# Patient Record
Sex: FEMALE | Race: OTHER | NOT HISPANIC OR LATINO | URBAN - METROPOLITAN AREA
[De-identification: names, ages, dates, MRNs, and addresses within clinical notes are randomized per-mention and may not be internally consistent; named-entity substitution may affect disease eponyms.]

---

## 2017-08-07 ENCOUNTER — OUTPATIENT (OUTPATIENT)
Dept: OUTPATIENT SERVICES | Facility: HOSPITAL | Age: 82
LOS: 1 days | Discharge: HOME | End: 2017-08-07

## 2017-08-07 DIAGNOSIS — C50.919 MALIGNANT NEOPLASM OF UNSPECIFIED SITE OF UNSPECIFIED FEMALE BREAST: ICD-10-CM

## 2017-08-09 ENCOUNTER — APPOINTMENT (OUTPATIENT)
Dept: HEMATOLOGY ONCOLOGY | Facility: CLINIC | Age: 82
End: 2017-08-09
Payer: MEDICARE

## 2017-08-09 VITALS
SYSTOLIC BLOOD PRESSURE: 135 MMHG | WEIGHT: 170.86 LBS | TEMPERATURE: 98.6 F | DIASTOLIC BLOOD PRESSURE: 87 MMHG | RESPIRATION RATE: 16 BRPM | BODY MASS INDEX: 30.27 KG/M2 | HEART RATE: 79 BPM | OXYGEN SATURATION: 96 %

## 2017-08-09 DIAGNOSIS — Z85.79 PERSONAL HISTORY OF OTHER MALIGNANT NEOPLASMS OF LYMPHOID, HEMATOPOIETIC AND RELATED TISSUES: ICD-10-CM

## 2017-08-09 PROCEDURE — 99215 OFFICE O/P EST HI 40 MIN: CPT

## 2018-07-18 ENCOUNTER — OUTPATIENT (OUTPATIENT)
Dept: OUTPATIENT SERVICES | Facility: HOSPITAL | Age: 83
LOS: 1 days | Discharge: HOME | End: 2018-07-18

## 2018-07-18 DIAGNOSIS — C50.919 MALIGNANT NEOPLASM OF UNSPECIFIED SITE OF UNSPECIFIED FEMALE BREAST: ICD-10-CM

## 2018-07-20 ENCOUNTER — OTHER (OUTPATIENT)
Age: 83
End: 2018-07-20

## 2018-07-24 ENCOUNTER — APPOINTMENT (OUTPATIENT)
Dept: HEMATOLOGY ONCOLOGY | Facility: CLINIC | Age: 83
End: 2018-07-24
Payer: MEDICARE

## 2018-07-24 VITALS
TEMPERATURE: 97.6 F | DIASTOLIC BLOOD PRESSURE: 90 MMHG | BODY MASS INDEX: 31.11 KG/M2 | SYSTOLIC BLOOD PRESSURE: 172 MMHG | RESPIRATION RATE: 16 BRPM | HEART RATE: 67 BPM | HEIGHT: 60.71 IN | OXYGEN SATURATION: 99 % | WEIGHT: 162.68 LBS

## 2018-07-24 PROCEDURE — 99214 OFFICE O/P EST MOD 30 MIN: CPT

## 2019-03-25 ENCOUNTER — TRANSCRIPTION ENCOUNTER (OUTPATIENT)
Age: 84
End: 2019-03-25

## 2019-07-31 ENCOUNTER — OUTPATIENT (OUTPATIENT)
Dept: OUTPATIENT SERVICES | Facility: HOSPITAL | Age: 84
LOS: 1 days | Discharge: HOME | End: 2019-07-31

## 2019-07-31 DIAGNOSIS — C50.919 MALIGNANT NEOPLASM OF UNSPECIFIED SITE OF UNSPECIFIED FEMALE BREAST: ICD-10-CM

## 2019-08-05 ENCOUNTER — APPOINTMENT (OUTPATIENT)
Dept: HEMATOLOGY ONCOLOGY | Facility: CLINIC | Age: 84
End: 2019-08-05
Payer: MEDICARE

## 2019-08-05 VITALS
SYSTOLIC BLOOD PRESSURE: 167 MMHG | BODY MASS INDEX: 27.76 KG/M2 | OXYGEN SATURATION: 93 % | RESPIRATION RATE: 16 BRPM | TEMPERATURE: 97.6 F | DIASTOLIC BLOOD PRESSURE: 102 MMHG | HEART RATE: 72 BPM | WEIGHT: 145.48 LBS

## 2019-08-05 PROCEDURE — 99214 OFFICE O/P EST MOD 30 MIN: CPT

## 2019-08-05 NOTE — ASSESSMENT
[FreeTextEntry1] : Pt to continue on surveillance for her treated breast ca and do mammograms, sonogram, bone density and Medical testing. Pt was encouraged to inc her calories and if weight loss continues she will need CT scans to assess for recurrent disease. Pt to RTO in 1 year or sooner if needed.Pt to get upper endoscopy and GI evaluation. [Curative] : Goals of care discussed with patient: Curative [Palliative Care Plan] : not applicable at this time

## 2019-08-05 NOTE — REVIEW OF SYSTEMS
[Negative] : Allergic/Immunologic [FreeTextEntry6] : 0cc cough pt has LPR [FreeTextEntry9] : has right thigh muscle stiffness and rigth thumb pain [FreeTextEntry7] : pt has LPR with reflux

## 2019-08-05 NOTE — HISTORY OF PRESENT ILLNESS
[de-identified] : The patient is an 84-year-old woman with history of breast carcinoma. Her history dates back to November 2002 when on mammogram the patient was found to have bilateral breast distortion and a biopsy of the left breast revealed infiltrating ductal carcinoma measuring 2 cm which was ER +90%, HI +90%, HER-2/sravani negative and 2 sentinel lymph nodes were negative. The right breast biopsy revealed tubular carcinoma, ER and HI positive. The patient underwent bilateral lumpectomies, bilateral radiation therapy and treatment with tamoxifen for 3 years and then aromasin which was switched to  anastrozole. She completed 5 years of aromatase inhibitor treatment. 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 [de-identified] : Since the last visit the pt remains well but has had a 17 lb weight loss and pt says she is not hungry on and off. Pt to do upper endoscopy for dysphagia.

## 2020-07-20 ENCOUNTER — RESULT REVIEW (OUTPATIENT)
Age: 85
End: 2020-07-20

## 2020-07-20 ENCOUNTER — APPOINTMENT (OUTPATIENT)
Dept: MAMMOGRAPHY | Facility: IMAGING CENTER | Age: 85
End: 2020-07-20
Payer: MEDICARE

## 2020-07-20 ENCOUNTER — APPOINTMENT (OUTPATIENT)
Dept: ULTRASOUND IMAGING | Facility: IMAGING CENTER | Age: 85
End: 2020-07-20
Payer: MEDICARE

## 2020-07-20 ENCOUNTER — OUTPATIENT (OUTPATIENT)
Dept: OUTPATIENT SERVICES | Facility: HOSPITAL | Age: 85
LOS: 1 days | End: 2020-07-20
Payer: MEDICARE

## 2020-07-20 DIAGNOSIS — C50.919 MALIGNANT NEOPLASM OF UNSPECIFIED SITE OF UNSPECIFIED FEMALE BREAST: ICD-10-CM

## 2020-07-20 PROCEDURE — 77067 SCR MAMMO BI INCL CAD: CPT | Mod: 26

## 2020-07-20 PROCEDURE — 77063 BREAST TOMOSYNTHESIS BI: CPT | Mod: 26

## 2020-07-20 PROCEDURE — 77067 SCR MAMMO BI INCL CAD: CPT

## 2020-07-20 PROCEDURE — 76641 ULTRASOUND BREAST COMPLETE: CPT

## 2020-07-20 PROCEDURE — 76641 ULTRASOUND BREAST COMPLETE: CPT | Mod: 26,50

## 2020-07-20 PROCEDURE — 77063 BREAST TOMOSYNTHESIS BI: CPT

## 2020-08-15 ENCOUNTER — OUTPATIENT (OUTPATIENT)
Dept: OUTPATIENT SERVICES | Facility: HOSPITAL | Age: 85
LOS: 1 days | Discharge: HOME | End: 2020-08-15

## 2020-08-15 DIAGNOSIS — C50.919 MALIGNANT NEOPLASM OF UNSPECIFIED SITE OF UNSPECIFIED FEMALE BREAST: ICD-10-CM

## 2020-08-19 ENCOUNTER — APPOINTMENT (OUTPATIENT)
Dept: HEMATOLOGY ONCOLOGY | Facility: CLINIC | Age: 85
End: 2020-08-19
Payer: MEDICARE

## 2020-08-19 PROCEDURE — 99442: CPT | Mod: 95

## 2020-08-19 NOTE — REASON FOR VISIT
[Home] : at home, [unfilled] , at the time of the visit. [Medical Office: ([facility])___] : at the medical office located in  [Verbal consent obtained from patient] : the patient, [unfilled] [Follow-Up Visit] : a follow-up [FreeTextEntry2] : breast ca

## 2020-08-19 NOTE — ASSESSMENT
[FreeTextEntry1] : The patient will continue monitoring for recurrence of her breast carcinoma which is been in remission with no evidence of recurrence for 18 years.  She will continue tapering her steroids for temporal arteritis.  She will continue to do mammograms sonogram and bone density.  She will continue medical evaluation and testing.  She will return to the office in 6 months or sooner as needed. [Curative] : Goals of care discussed with patient: Curative [Palliative Care Plan] : not applicable at this time

## 2020-08-19 NOTE — HISTORY OF PRESENT ILLNESS
[de-identified] : The patient is an 84-year-old woman with history of breast carcinoma. Her history dates back to November 2002 when on mammogram the patient was found to have bilateral breast distortion and a biopsy of the left breast revealed infiltrating ductal carcinoma measuring 2 cm which was ER +90%, FL +90%, HER-2/sravani negative and 2 sentinel lymph nodes were negative. The right breast biopsy revealed tubular carcinoma, ER and FL positive. The patient underwent bilateral lumpectomies, bilateral radiation therapy and treatment with tamoxifen for 3 years and then aromasin which was switched to  anastrozole. She completed 5 years of aromatase inhibitor treatment. 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 [de-identified] : Since the last visit the patient continues to do well and celebrated her 89th birthday.  She has no complaints and has had no recurrence of her breast carcinoma since 2002 18 years  ago.  The patient has also been on low-dose steroids prednisone for temporal arteritis.  She also has a history of mycosis fungoides and uses light therapy.

## 2021-07-20 ENCOUNTER — RESULT REVIEW (OUTPATIENT)
Age: 86
End: 2021-07-20

## 2021-07-21 ENCOUNTER — OUTPATIENT (OUTPATIENT)
Dept: OUTPATIENT SERVICES | Facility: HOSPITAL | Age: 86
LOS: 1 days | End: 2021-07-21
Payer: MEDICARE

## 2021-07-21 ENCOUNTER — RESULT REVIEW (OUTPATIENT)
Age: 86
End: 2021-07-21

## 2021-07-21 ENCOUNTER — APPOINTMENT (OUTPATIENT)
Dept: MAMMOGRAPHY | Facility: IMAGING CENTER | Age: 86
End: 2021-07-21
Payer: MEDICARE

## 2021-07-21 ENCOUNTER — APPOINTMENT (OUTPATIENT)
Dept: ULTRASOUND IMAGING | Facility: IMAGING CENTER | Age: 86
End: 2021-07-21
Payer: MEDICARE

## 2021-07-21 DIAGNOSIS — C50.919 MALIGNANT NEOPLASM OF UNSPECIFIED SITE OF UNSPECIFIED FEMALE BREAST: ICD-10-CM

## 2021-07-21 PROCEDURE — 77067 SCR MAMMO BI INCL CAD: CPT | Mod: 26

## 2021-07-21 PROCEDURE — 76641 ULTRASOUND BREAST COMPLETE: CPT | Mod: 26,50

## 2021-07-21 PROCEDURE — 77063 BREAST TOMOSYNTHESIS BI: CPT

## 2021-07-21 PROCEDURE — 77063 BREAST TOMOSYNTHESIS BI: CPT | Mod: 26

## 2021-07-21 PROCEDURE — 76641 ULTRASOUND BREAST COMPLETE: CPT

## 2021-07-21 PROCEDURE — 77067 SCR MAMMO BI INCL CAD: CPT

## 2021-08-03 ENCOUNTER — OUTPATIENT (OUTPATIENT)
Dept: OUTPATIENT SERVICES | Facility: HOSPITAL | Age: 86
LOS: 1 days | Discharge: HOME | End: 2021-08-03

## 2021-08-03 DIAGNOSIS — C50.919 MALIGNANT NEOPLASM OF UNSPECIFIED SITE OF UNSPECIFIED FEMALE BREAST: ICD-10-CM

## 2021-08-04 ENCOUNTER — APPOINTMENT (OUTPATIENT)
Dept: HEMATOLOGY ONCOLOGY | Facility: CLINIC | Age: 86
End: 2021-08-04
Payer: MEDICARE

## 2021-08-04 VITALS
HEIGHT: 61.02 IN | OXYGEN SATURATION: 97 % | TEMPERATURE: 98 F | RESPIRATION RATE: 16 BRPM | DIASTOLIC BLOOD PRESSURE: 95 MMHG | BODY MASS INDEX: 24.35 KG/M2 | WEIGHT: 128.97 LBS | HEART RATE: 69 BPM | SYSTOLIC BLOOD PRESSURE: 170 MMHG

## 2021-08-04 PROCEDURE — 99214 OFFICE O/P EST MOD 30 MIN: CPT

## 2021-08-04 NOTE — HISTORY OF PRESENT ILLNESS
[de-identified] : The patient is an 84-year-old woman with history of breast carcinoma. Her history dates back to November 2002 when on mammogram the patient was found to have bilateral breast distortion and a biopsy of the left breast revealed infiltrating ductal carcinoma measuring 2 cm which was ER +90%, MA +90%, HER-2/sravani negative and 2 sentinel lymph nodes were negative. The right breast biopsy revealed tubular carcinoma, ER and MA positive. The patient underwent bilateral lumpectomies, bilateral radiation therapy and treatment with tamoxifen for 3 years and then aromasin which was switched to  anastrozole. She completed 5 years of aromatase inhibitor treatment. 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 [de-identified] : Since the last visit the pt has had neck pain and BCE on tip of nose. pt uses cryoderm roll on. Pt on 1 mg of Prednisone. pt on prolia and synthroid, probiotic

## 2021-08-04 NOTE — REVIEW OF SYSTEMS
[Negative] : Allergic/Immunologic [FreeTextEntry7] : GERD on rx with acid reducer pantoprazole [de-identified] : No no skin lesions noted.

## 2021-08-04 NOTE — ASSESSMENT
[FreeTextEntry1] : The patient will continue surveillance for her breast carcinoma with recurrence in 2017.  She will continue on anastrozole and be followed for side effects and toxicity.  She will also do a bone density and cholesterol monitoring.  She will continue cardiac follow-up on Eliquis.  The patient will continue medical, GI and cardiology follow-up and testing.  She will return to the office in 1 year or sooner as needed.  She will undergo repeat mammogram and ultrasound. [Curative] : Goals of care discussed with patient: Curative [Palliative Care Plan] : not applicable at this time

## 2022-04-13 ENCOUNTER — RESULT REVIEW (OUTPATIENT)
Age: 87
End: 2022-04-13

## 2022-07-25 ENCOUNTER — APPOINTMENT (OUTPATIENT)
Dept: ULTRASOUND IMAGING | Facility: IMAGING CENTER | Age: 87
End: 2022-07-25

## 2022-07-25 ENCOUNTER — OUTPATIENT (OUTPATIENT)
Dept: OUTPATIENT SERVICES | Facility: HOSPITAL | Age: 87
LOS: 1 days | End: 2022-07-25
Payer: MEDICARE

## 2022-07-25 ENCOUNTER — RESULT REVIEW (OUTPATIENT)
Age: 87
End: 2022-07-25

## 2022-07-25 ENCOUNTER — APPOINTMENT (OUTPATIENT)
Dept: MAMMOGRAPHY | Facility: IMAGING CENTER | Age: 87
End: 2022-07-25

## 2022-07-25 DIAGNOSIS — C50.919 MALIGNANT NEOPLASM OF UNSPECIFIED SITE OF UNSPECIFIED FEMALE BREAST: ICD-10-CM

## 2022-07-25 PROCEDURE — 77067 SCR MAMMO BI INCL CAD: CPT

## 2022-07-25 PROCEDURE — 77066 DX MAMMO INCL CAD BI: CPT

## 2022-07-25 PROCEDURE — 77063 BREAST TOMOSYNTHESIS BI: CPT | Mod: 26

## 2022-07-25 PROCEDURE — 77067 SCR MAMMO BI INCL CAD: CPT | Mod: 26

## 2022-07-25 PROCEDURE — 77063 BREAST TOMOSYNTHESIS BI: CPT

## 2022-07-25 PROCEDURE — 76641 ULTRASOUND BREAST COMPLETE: CPT

## 2022-07-25 PROCEDURE — G0279: CPT

## 2022-07-25 PROCEDURE — 76641 ULTRASOUND BREAST COMPLETE: CPT | Mod: 26,50

## 2022-07-28 ENCOUNTER — OUTPATIENT (OUTPATIENT)
Dept: OUTPATIENT SERVICES | Facility: HOSPITAL | Age: 87
LOS: 1 days | End: 2022-07-28
Payer: MEDICARE

## 2022-07-28 ENCOUNTER — RESULT REVIEW (OUTPATIENT)
Age: 87
End: 2022-07-28

## 2022-07-28 ENCOUNTER — APPOINTMENT (OUTPATIENT)
Dept: MAMMOGRAPHY | Facility: IMAGING CENTER | Age: 87
End: 2022-07-28

## 2022-07-28 DIAGNOSIS — C50.919 MALIGNANT NEOPLASM OF UNSPECIFIED SITE OF UNSPECIFIED FEMALE BREAST: ICD-10-CM

## 2022-07-28 PROCEDURE — 88305 TISSUE EXAM BY PATHOLOGIST: CPT | Mod: 26

## 2022-07-28 PROCEDURE — 19081 BX BREAST 1ST LESION STRTCTC: CPT | Mod: LT

## 2022-07-28 PROCEDURE — 77065 DX MAMMO INCL CAD UNI: CPT

## 2022-07-28 PROCEDURE — A4648: CPT

## 2022-07-28 PROCEDURE — 88360 TUMOR IMMUNOHISTOCHEM/MANUAL: CPT | Mod: 26

## 2022-07-28 PROCEDURE — 88305 TISSUE EXAM BY PATHOLOGIST: CPT

## 2022-07-28 PROCEDURE — 19081 BX BREAST 1ST LESION STRTCTC: CPT

## 2022-07-28 PROCEDURE — 77065 DX MAMMO INCL CAD UNI: CPT | Mod: 26,LT

## 2022-07-28 PROCEDURE — 88360 TUMOR IMMUNOHISTOCHEM/MANUAL: CPT

## 2022-08-01 LAB — SURGICAL PATHOLOGY STUDY: SIGNIFICANT CHANGE UP

## 2022-08-09 ENCOUNTER — OUTPATIENT (OUTPATIENT)
Dept: OUTPATIENT SERVICES | Facility: HOSPITAL | Age: 87
LOS: 1 days | Discharge: HOME | End: 2022-08-09

## 2022-08-09 DIAGNOSIS — C50.919 MALIGNANT NEOPLASM OF UNSPECIFIED SITE OF UNSPECIFIED FEMALE BREAST: ICD-10-CM

## 2022-08-12 ENCOUNTER — APPOINTMENT (OUTPATIENT)
Dept: HEMATOLOGY ONCOLOGY | Facility: CLINIC | Age: 87
End: 2022-08-12

## 2022-08-12 VITALS
DIASTOLIC BLOOD PRESSURE: 78 MMHG | TEMPERATURE: 97.5 F | OXYGEN SATURATION: 97 % | WEIGHT: 122.8 LBS | HEART RATE: 68 BPM | RESPIRATION RATE: 16 BRPM | HEIGHT: 59.45 IN | BODY MASS INDEX: 24.43 KG/M2 | SYSTOLIC BLOOD PRESSURE: 177 MMHG

## 2022-08-12 PROCEDURE — 99214 OFFICE O/P EST MOD 30 MIN: CPT

## 2022-08-12 NOTE — REVIEW OF SYSTEMS
[Joint Pain] : joint pain [Joint Stiffness] : joint stiffness [Negative] : Respiratory [FreeTextEntry4] : chronic PND [FreeTextEntry7] : has GERD

## 2022-08-12 NOTE — HISTORY OF PRESENT ILLNESS
[Disease: _____________________] : Disease: [unfilled] [T: ___] : T[unfilled] [N: ___] : N[unfilled] [M: ___] : M[unfilled] [AJCC Stage: ____] : AJCC Stage: [unfilled] [de-identified] : The patient is an 84-year-old woman with history of breast carcinoma. Her history dates back to November 2002 when on mammogram the patient was found to have bilateral breast distortion and a biopsy of the left breast revealed infiltrating ductal carcinoma measuring 2 cm which was ER +90%, CT +90%, HER-2/sravani negative and 2 sentinel lymph nodes were negative. The right breast biopsy revealed tubular carcinoma, ER and CT positive. The patient underwent bilateral lumpectomies, bilateral radiation therapy and treatment with tamoxifen for 3 years and then aromasin which was switched to  anastrozole. She completed 5 years of aromatase inhibitor treatment. 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 [de-identified] : 2002- bilqteral breast ca , Left -IDC er and [place] ++, Roight- tubular caER and [place] pos  [de-identified] : Rx bilateral lumpectomies and RT and hormonal therapy bob and AI. [de-identified] : Pt has temporal arteritis, GERD, recurrent breast ca, has nocturia, pt has fatigue and sees Dr. Galindo for DJD

## 2022-08-12 NOTE — ASSESSMENT
[FreeTextEntry1] : A discussion took place with the patient regarding further management.  She has a history of bilateral breast carcinoma in 2002 infiltrating duct carcinoma stage I on the left and tubular carcinoma on the right stage I.  The patient underwent bilateral lumpectomies, radiation therapy and hormonal therapy.  She has done well until her recent mammogram showed calcifications in the left breast and biopsy now shows invasive moderately differentiated ductal carcinoma Vanessa score 6/9 measuring at least 6 mm along with ductal carcinoma in situ.  Lymphovascular invasion was not seen and the ER/FL HER2/sravani testing is pending.\par \par At this time the patient is reluctant to consider surgery because of her age.  It was recommended that she undergo surgical consultation which she is scheduling.  If she decides she does not want surgery then we will refer her for radiation therapy and hormonal therapy would also be recommended depending on the results of the hormone receptors.  The patient is also being monitored for her history of mycosis fungoides which remained stable.  Once we have more information further recommendations will be made. [Curative] : Goals of care discussed with patient: Curative [Palliative Care Plan] : not applicable at this time

## 2022-09-09 ENCOUNTER — APPOINTMENT (OUTPATIENT)
Dept: HEMATOLOGY ONCOLOGY | Facility: CLINIC | Age: 87
End: 2022-09-09

## 2022-09-09 VITALS
BODY MASS INDEX: 24.56 KG/M2 | RESPIRATION RATE: 16 BRPM | TEMPERATURE: 98.1 F | HEIGHT: 59.45 IN | HEART RATE: 77 BPM | SYSTOLIC BLOOD PRESSURE: 176 MMHG | OXYGEN SATURATION: 96 % | DIASTOLIC BLOOD PRESSURE: 81 MMHG | WEIGHT: 123.46 LBS

## 2022-09-09 PROCEDURE — 99213 OFFICE O/P EST LOW 20 MIN: CPT

## 2022-09-09 NOTE — ASSESSMENT
[FreeTextEntry1] : Pt to start letrrozole for left breast  ca IDC 6 mm ER and MT pos. Pt does not want surgery or RT at this time and will be followed for response to hormonal therapy. Side effects and toxicities were discussed in detail. Pt to repeat landon /sono in 3-4 months.  The patient will return to the office in 3 to 4 months for reevaluation. [Curative] : Goals of care discussed with patient: Curative [Palliative Care Plan] : not applicable at this time

## 2022-09-09 NOTE — REVIEW OF SYSTEMS
[Negative] : Allergic/Immunologic [FreeTextEntry7] : GERD [FreeTextEntry8] : has nocturia [FreeTextEntry9] : has knee hip and hand pain from DJD.

## 2022-09-09 NOTE — HISTORY OF PRESENT ILLNESS
[Disease: _____________________] : Disease: [unfilled] [T: ___] : T[unfilled] [N: ___] : N[unfilled] [M: ___] : M[unfilled] [AJCC Stage: ____] : AJCC Stage: [unfilled] [de-identified] : The patient is an 84-year-old woman with history of breast carcinoma. Her history dates back to November 2002 when on mammogram the patient was found to have bilateral breast distortion and a biopsy of the left breast revealed infiltrating ductal carcinoma measuring 2 cm which was ER +90%, GA +90%, HER-2/sravani negative and 2 sentinel lymph nodes were negative. The right breast biopsy revealed tubular carcinoma, ER and GA positive. The patient underwent bilateral lumpectomies, bilateral radiation therapy and treatment with tamoxifen for 3 years and then aromasin which was switched to  anastrozole. She completed 5 years of aromatase inhibitor treatment. 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 [de-identified] : 2002- bilqteral breast ca , Left -IDC er and [place] ++, Roight- tubular caER and [place] pos  [de-identified] : Rx bilateral lumpectomies and RT and hormonal therapy bob and AI. [de-identified] : Since the last visit the patient has undergone surgical consultation for newly diagnosed left breast carcinoma measuring 6 mm which is ER/TN positive and HER2 negative.  The at the present time the patient does not want to undergo surgery or radiation therapy.  We discussed going on letrozole since the tumor was strongly hormone positive and then monitoring her for side effects and toxicity with follow-up mammograms to assess for response to treatment.  The side effects were discussed in detail.  The patient will return in 3 months for follow-up evaluation

## 2022-11-17 ENCOUNTER — RESULT REVIEW (OUTPATIENT)
Age: 87
End: 2022-11-17

## 2022-12-14 ENCOUNTER — OUTPATIENT (OUTPATIENT)
Dept: OUTPATIENT SERVICES | Facility: HOSPITAL | Age: 87
LOS: 1 days | Discharge: HOME | End: 2022-12-14

## 2022-12-14 DIAGNOSIS — C50.919 MALIGNANT NEOPLASM OF UNSPECIFIED SITE OF UNSPECIFIED FEMALE BREAST: ICD-10-CM

## 2022-12-16 ENCOUNTER — APPOINTMENT (OUTPATIENT)
Dept: MRI IMAGING | Facility: IMAGING CENTER | Age: 87
End: 2022-12-16

## 2022-12-16 ENCOUNTER — OUTPATIENT (OUTPATIENT)
Dept: OUTPATIENT SERVICES | Facility: HOSPITAL | Age: 87
LOS: 1 days | End: 2022-12-16
Payer: MEDICARE

## 2022-12-16 DIAGNOSIS — C50.919 MALIGNANT NEOPLASM OF UNSPECIFIED SITE OF UNSPECIFIED FEMALE BREAST: ICD-10-CM

## 2022-12-16 PROCEDURE — C8937: CPT

## 2022-12-16 PROCEDURE — C8908: CPT

## 2022-12-16 PROCEDURE — C8905: CPT

## 2022-12-16 PROCEDURE — 77049 MRI BREAST C-+ W/CAD BI: CPT | Mod: 26

## 2022-12-20 ENCOUNTER — APPOINTMENT (OUTPATIENT)
Dept: HEMATOLOGY ONCOLOGY | Facility: CLINIC | Age: 87
End: 2022-12-20

## 2022-12-20 VITALS
OXYGEN SATURATION: 94 % | HEART RATE: 75 BPM | TEMPERATURE: 97.1 F | BODY MASS INDEX: 24.56 KG/M2 | SYSTOLIC BLOOD PRESSURE: 140 MMHG | WEIGHT: 123.46 LBS | RESPIRATION RATE: 16 BRPM | DIASTOLIC BLOOD PRESSURE: 80 MMHG

## 2022-12-20 PROCEDURE — 99214 OFFICE O/P EST MOD 30 MIN: CPT

## 2023-07-25 ENCOUNTER — OUTPATIENT (OUTPATIENT)
Dept: OUTPATIENT SERVICES | Facility: HOSPITAL | Age: 88
LOS: 1 days | Discharge: HOME | End: 2023-07-25

## 2023-07-25 DIAGNOSIS — C50.919 MALIGNANT NEOPLASM OF UNSPECIFIED SITE OF UNSPECIFIED FEMALE BREAST: ICD-10-CM

## 2023-07-27 ENCOUNTER — APPOINTMENT (OUTPATIENT)
Dept: ULTRASOUND IMAGING | Facility: IMAGING CENTER | Age: 88
End: 2023-07-27
Payer: MEDICARE

## 2023-07-27 ENCOUNTER — OUTPATIENT (OUTPATIENT)
Dept: OUTPATIENT SERVICES | Facility: HOSPITAL | Age: 88
LOS: 1 days | End: 2023-07-27
Payer: MEDICARE

## 2023-07-27 ENCOUNTER — RESULT REVIEW (OUTPATIENT)
Age: 88
End: 2023-07-27

## 2023-07-27 ENCOUNTER — APPOINTMENT (OUTPATIENT)
Dept: MAMMOGRAPHY | Facility: IMAGING CENTER | Age: 88
End: 2023-07-27
Payer: MEDICARE

## 2023-07-27 DIAGNOSIS — C50.919 MALIGNANT NEOPLASM OF UNSPECIFIED SITE OF UNSPECIFIED FEMALE BREAST: ICD-10-CM

## 2023-07-27 PROCEDURE — 76641 ULTRASOUND BREAST COMPLETE: CPT | Mod: 26,50,GA

## 2023-07-27 PROCEDURE — 77066 DX MAMMO INCL CAD BI: CPT

## 2023-07-27 PROCEDURE — G0279: CPT

## 2023-07-27 PROCEDURE — 77066 DX MAMMO INCL CAD BI: CPT | Mod: 26

## 2023-07-27 PROCEDURE — G0279: CPT | Mod: 26

## 2023-07-27 PROCEDURE — 76641 ULTRASOUND BREAST COMPLETE: CPT

## 2023-08-01 DIAGNOSIS — Z08 ENCOUNTER FOR FOLLOW-UP EXAMINATION AFTER COMPLETED TREATMENT FOR MALIGNANT NEOPLASM: ICD-10-CM

## 2023-08-01 DIAGNOSIS — Z85.3 ENCOUNTER FOR FOLLOW-UP EXAMINATION AFTER COMPLETED TREATMENT FOR MALIGNANT NEOPLASM: ICD-10-CM

## 2023-08-02 ENCOUNTER — APPOINTMENT (OUTPATIENT)
Dept: HEMATOLOGY ONCOLOGY | Facility: CLINIC | Age: 88
End: 2023-08-02
Payer: MEDICARE

## 2023-08-02 VITALS
SYSTOLIC BLOOD PRESSURE: 184 MMHG | DIASTOLIC BLOOD PRESSURE: 86 MMHG | OXYGEN SATURATION: 97 % | HEIGHT: 59.45 IN | WEIGHT: 120.15 LBS | TEMPERATURE: 97.2 F | HEART RATE: 70 BPM | RESPIRATION RATE: 17 BRPM | BODY MASS INDEX: 23.9 KG/M2

## 2023-08-02 DIAGNOSIS — C84.00 MYCOSIS FUNGOIDES, UNSPECIFIED SITE: ICD-10-CM

## 2023-08-02 DIAGNOSIS — C50.919 MALIGNANT NEOPLASM OF UNSPECIFIED SITE OF UNSPECIFIED FEMALE BREAST: ICD-10-CM

## 2023-08-02 DIAGNOSIS — M81.0 AGE-RELATED OSTEOPOROSIS W/OUT CURRENT PATHOLOGICAL FRACTURE: ICD-10-CM

## 2023-08-02 PROCEDURE — 99214 OFFICE O/P EST MOD 30 MIN: CPT

## 2023-08-02 RX ORDER — LETROZOLE TABLETS 2.5 MG/1
2.5 TABLET, FILM COATED ORAL
Qty: 90 | Refills: 3 | Status: ACTIVE | COMMUNITY
Start: 2022-09-09 | End: 1900-01-01

## 2023-08-03 PROBLEM — C84.00 MYCOSIS FUNGOIDES: Status: ACTIVE | Noted: 2017-08-09

## 2023-08-03 NOTE — HISTORY OF PRESENT ILLNESS
[Disease: _____________________] : Disease: [unfilled] [T: ___] : T[unfilled] [N: ___] : N[unfilled] [M: ___] : M[unfilled] [AJCC Stage: ____] : AJCC Stage: [unfilled] [de-identified] : The patient is an 84-year-old woman with history of breast carcinoma. Her history dates back to November 2002 when on mammogram the patient was found to have bilateral breast distortion and a biopsy of the left breast revealed infiltrating ductal carcinoma measuring 2 cm which was ER +90%, MT +90%, HER-2/sravani negative and 2 sentinel lymph nodes were negative. The right breast biopsy revealed tubular carcinoma, ER and MT positive. The patient underwent bilateral lumpectomies, bilateral radiation therapy and treatment with tamoxifen for 3 years and then aromasin which was switched to  anastrozole. She completed 5 years of aromatase inhibitor treatment. 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  Biopsy however revealed positive for infiltrating duct carcinoma ER/MT positive and the patient now is on letrozole for the last 3 months.  She tolerates the treatment well and returns to discuss further management.  The patient has undergone consultation with her surgeon Dr. Nichols and the patient chose not to do surgery at this time.  She underwent a MRI of the breast which again shows the area of calcification in the left breast measuring 2.5 cm.  There is no adenopathy or disease in the other breast. [de-identified] : 2002- bilateral breast ca , Left -IDC er and [place] ++, Roight- tubular caER and [place] pos  [de-identified] : Rx bilateral lumpectomies and RT and hormonal therapy bob and AI. [de-identified] : The patient returns for follow-up with history of left breast carcinoma on 2 occasions and now on anastrozole hormonal therapy.  She states she tolerates the medication well and has been on it for about 1 year.

## 2023-08-03 NOTE — REVIEW OF SYSTEMS
[Diarrhea: Grade 0] : Diarrhea: Grade 0 [Negative] : Allergic/Immunologic [FreeTextEntry4] : pt has post nasal drip [FreeTextEntry6] : pt has cough rom post nasal drip

## 2023-08-03 NOTE — PHYSICAL EXAM
[Ambulatory and capable of all self care but unable to carry out any work activities] : Status 2- Ambulatory and capable of all self care but unable to carry out any work activities. Up and about more than 50% of waking hours [Normal] : affect appropriate [de-identified] : pt has left breast lumpectomy incisions x 2

## 2023-08-03 NOTE — ASSESSMENT
[FreeTextEntry1] : The patient will continue surveillance for her second left breast primary Alex diagnosed in 2022.  The patient elected not to have surgery and currently is on hormonal therapy with anastrozole.  She tolerates the treatment well except for occasional minor hot flashes.  Her only symptoms cough from postnasal drip for which she uses nasal spray.  Patient also has mycosis fungoides but this condition is under control with no recent outbreaks or symptoms related to this condition.  The patient continues on surveillance with mammograms and she would be a candidate for bone density testing as she has a history of osteoporosis.  The patient will return to the office in 6 months or sooner as needed for continued treatment and surveillance. [Curative] : Goals of care discussed with patient: Curative

## 2023-08-17 NOTE — PHYSICAL EXAM
[Restricted in physically strenuous activity but ambulatory and able to carry out work of a light or sedentary nature] : Status 1- Restricted in physically strenuous activity but ambulatory and able to carry out work of a light or sedentary nature, e.g., light house work, office work [Normal] : affect appropriate [de-identified] : left lumpectomy, no mass

## 2023-08-17 NOTE — HISTORY OF PRESENT ILLNESS
[Disease: _____________________] : Disease: [unfilled] [T: ___] : T[unfilled] [N: ___] : N[unfilled] [M: ___] : M[unfilled] [AJCC Stage: ____] : AJCC Stage: [unfilled] [de-identified] : The patient is an 84-year-old woman with history of breast carcinoma. Her history dates back to November 2002 when on mammogram the patient was found to have bilateral breast distortion and a biopsy of the left breast revealed infiltrating ductal carcinoma measuring 2 cm which was ER +90%, IA +90%, HER-2/sravani negative and 2 sentinel lymph nodes were negative. The right breast biopsy revealed tubular carcinoma, ER and IA positive. The patient underwent bilateral lumpectomies, bilateral radiation therapy and treatment with tamoxifen for 3 years and then aromasin which was switched to  anastrozole. She completed 5 years of aromatase inhibitor treatment. 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  Biopsy however revealed positive for infiltrating duct carcinoma ER/IA positive and the patient now is on letrozole for the last 3 months.  She tolerates the treatment well and returns to discuss further management.  The patient has undergone consultation with her surgeon Dr. Nichols and the patient chose not to do surgery at this time.  She underwent a MRI of the breast which again shows the area of calcification in the left breast measuring 2.5 cm.  There is no adenopathy or disease in the other breast. [de-identified] : 2002- bilateral breast ca , Left -IDC er and [place] ++, Roight- tubular caER and [place] pos  [de-identified] : Rx bilateral lumpectomies and RT and hormonal therapy bob and AI. [de-identified] : Since the last visit the pt has been on letrozole and tolerates rx well.

## 2023-08-17 NOTE — ASSESSMENT
[Curative] : Goals of care discussed with patient: Curative [Palliative Care Plan] : not applicable at this time [FreeTextEntry1] : Overall the patient remained stable and has no signs or symptoms indicating progression of disease.  It is recommended she continue her letrozole which she tolerates well but she is concerned about continuing on medication.  She is now considering undergoing surgical resection and she will contact her surgeon Dr. Roberts.  Otherwise if she decides to hold surgery she will continue her hormonal therapy and return to the office in 3 months or sooner as needed.

## 2024-01-26 ENCOUNTER — APPOINTMENT (OUTPATIENT)
Dept: PODIATRY | Facility: CLINIC | Age: 89
End: 2024-01-26
Payer: MEDICARE

## 2024-01-26 DIAGNOSIS — M20.40 OTHER HAMMER TOE(S) (ACQUIRED), UNSPECIFIED FOOT: ICD-10-CM

## 2024-01-26 DIAGNOSIS — M20.10 HALLUX VALGUS (ACQUIRED), UNSPECIFIED FOOT: ICD-10-CM

## 2024-01-26 PROCEDURE — 11720 DEBRIDE NAIL 1-5: CPT | Mod: 59

## 2024-01-26 PROCEDURE — 99202 OFFICE O/P NEW SF 15 MIN: CPT | Mod: 25

## 2024-01-26 PROCEDURE — 11056 PARNG/CUTG B9 HYPRKR LES 2-4: CPT

## 2024-01-31 PROBLEM — M20.40 HAMMERTOE: Status: ACTIVE | Noted: 2024-01-29

## 2024-01-31 PROBLEM — M20.10 HALLUX VALGUS: Status: ACTIVE | Noted: 2024-01-29

## 2024-01-31 NOTE — HISTORY OF PRESENT ILLNESS
[FreeTextEntry1] : Patient is 89 years old and presents today with severe pain bilateral. She has a preulcerative lesion at the right tailor's bunion. She has a bunion and arthritic hammertoes with painful keratotic lesion that she is putting all sorts of pads on.  She cannot have surgery as she is 89. She feels like she had polio as a kid that was undiagnosed and her right foot is shorter and she has ankle problems.

## 2024-01-31 NOTE — ASSESSMENT
[FreeTextEntry1] : Impression: Keratosis. Onychomycosis. PVD. Pain. Hammertoe. Bunion.  Treatment: I trimmed keratotic lesions x4 without issue. I aggressively debrided and debulked painful mycotic nails x4 so she is more comfortable in shoe gear. I gave her aperture pads for the tailor's bunion, and I added 1/4" off-load into her shoe to destress this area and avoid potential problems. I discussed soaking with warm water and Epsom salt. I discussed better pads for her to be using over time. She will inspect the foot daily and follow-up in the office for high-risk foot care.

## 2024-01-31 NOTE — PHYSICAL EXAM
[Delayed in the Right Toes] : capillary refills delayed in the right toes [Delayed in the Left Toes] : capillary refills delayed in the left toes [0] : left foot posterior tibialis 0 [1+] : left foot dorsalis pedis 1+ [Vibration Dec.] : diminished vibratory sensation at the level of the toes [Diminished Throughout Right Foot] : diminished sensation with monofilament testing throughout right foot [Diminished Throughout Left Foot] : diminished sensation with monofilament testing throughout left foot [FreeTextEntry3] : Absent hair growth. Thin, atrophic skin. The patient has a class finding of Q8-Two Class B findings.  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 She has tailor's bunion with painful preulcerative keratotic lesion and deep seated keratosis that is starting to want to break down.  [FreeTextEntry1] : She has a preulcerative right sub 5 keratosis. She has a 1st interspace, 2nd toe DIPJ keratosis. Very painful distal 3rd toe and left 5th toe PIPJ keratosis. She has onychomycosis in most nails but the ones that are painful are 1, 3 and 4 on the left and 1 on the right. They are white, yellow, brown, thick, dystrophic, deformed and painful at the tops and tips.

## 2024-04-05 ENCOUNTER — APPOINTMENT (OUTPATIENT)
Dept: PODIATRY | Facility: CLINIC | Age: 89
End: 2024-04-05
Payer: MEDICARE

## 2024-04-05 DIAGNOSIS — M79.675 PAIN IN RIGHT TOE(S): ICD-10-CM

## 2024-04-05 DIAGNOSIS — M79.674 PAIN IN RIGHT TOE(S): ICD-10-CM

## 2024-04-05 DIAGNOSIS — B35.1 TINEA UNGUIUM: ICD-10-CM

## 2024-04-05 DIAGNOSIS — L85.1 ACQUIRED KERATOSIS [KERATODERMA] PALMARIS ET PLANTARIS: ICD-10-CM

## 2024-04-05 DIAGNOSIS — I70.91 GENERALIZED ATHEROSCLEROSIS: ICD-10-CM

## 2024-04-05 PROCEDURE — 11720 DEBRIDE NAIL 1-5: CPT | Mod: Q8,59

## 2024-04-05 PROCEDURE — 11056 PARNG/CUTG B9 HYPRKR LES 2-4: CPT | Mod: Q8

## 2024-04-08 PROBLEM — M79.674 PAIN IN TOES OF BOTH FEET: Status: ACTIVE | Noted: 2024-01-29

## 2024-04-08 PROBLEM — B35.1 ONYCHOMYCOSIS: Status: ACTIVE | Noted: 2024-01-29

## 2024-04-08 PROBLEM — L85.1 KERATODERMA, ACQUIRED: Status: ACTIVE | Noted: 2024-01-29

## 2024-04-08 PROBLEM — I70.91 GENERALIZED ATHEROSCLEROSIS: Status: ACTIVE | Noted: 2024-01-29

## 2024-04-09 NOTE — ASSESSMENT
[FreeTextEntry1] : Impression: Keratosis. Onychomycosis. PVD. Pain. Hammertoe.   Treatment: The foot was prepped with alcohol, and I trimmed keratotic lesions x4 without issue. I aggressively debrided and debulked painful mycotic nails x4 so she is more comfortable in shoe gear. I want her to get tube foam protectors and soak with warm water and Epsom salt. Continue extra width shoes.  She will inspect the foot daily and follow-up in the office for high-risk foot care.

## 2024-04-09 NOTE — PHYSICAL EXAM
[Delayed in the Right Toes] : capillary refills delayed in the right toes [Delayed in the Left Toes] : capillary refills delayed in the left toes [0] : left foot posterior tibialis 0 [1+] : left foot dorsalis pedis 1+ [Vibration Dec.] : diminished vibratory sensation at the level of the toes [Diminished Throughout Right Foot] : diminished sensation with monofilament testing throughout right foot [Diminished Throughout Left Foot] : diminished sensation with monofilament testing throughout left foot [FreeTextEntry3] : Absent hair growth. Thin, atrophic skin. The patient has a class finding of Q8-Two Class B findings.  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 She has tailor's bunion with painful preulcerative keratotic lesion and deep seated keratosis that is starting to want to break down.  [FreeTextEntry1] : She has a preulcerative right sub 5 keratosis. She has a 1st interspace, 2nd toe DIPJ keratosis. Very painful distal 3rd toe and left 5th toe PIPJ keratosis. She has onychomycosis in most nails but the ones that are painful are 1, 3 and 4 on the left and 1 on the right. They are white, yellow, brown, thick, dystrophic, deformed and painful at the tops and tips.

## 2024-04-09 NOTE — HISTORY OF PRESENT ILLNESS
[FreeTextEntry1] : Patient presents today for high-risk foot care due to poor circulation. She has worsening keratosis and onychomycosis that cause her a lot of pain.  The patient's history and physical has been reviewed and verified with no changes.

## 2024-07-10 ENCOUNTER — APPOINTMENT (OUTPATIENT)
Dept: PODIATRY | Facility: CLINIC | Age: 89
End: 2024-07-10
Payer: MEDICARE

## 2024-07-10 DIAGNOSIS — M20.40 OTHER HAMMER TOE(S) (ACQUIRED), UNSPECIFIED FOOT: ICD-10-CM

## 2024-07-10 DIAGNOSIS — M79.676 PAIN IN UNSPECIFIED TOE(S): ICD-10-CM

## 2024-07-10 PROCEDURE — 99212 OFFICE O/P EST SF 10 MIN: CPT

## 2024-07-15 PROBLEM — M79.676 TOE PAIN: Status: ACTIVE | Noted: 2024-07-12

## 2024-07-23 ENCOUNTER — APPOINTMENT (OUTPATIENT)
Dept: PODIATRY | Facility: CLINIC | Age: 89
End: 2024-07-23
Payer: MEDICARE

## 2024-07-23 DIAGNOSIS — M79.676 PAIN IN UNSPECIFIED TOE(S): ICD-10-CM

## 2024-07-23 DIAGNOSIS — M20.40 OTHER HAMMER TOE(S) (ACQUIRED), UNSPECIFIED FOOT: ICD-10-CM

## 2024-07-23 PROCEDURE — 99212 OFFICE O/P EST SF 10 MIN: CPT

## 2024-07-26 NOTE — PHYSICAL EXAM
[Delayed in the Right Toes] : capillary refills delayed in the right toes [Delayed in the Left Toes] : capillary refills delayed in the left toes [0] : left foot posterior tibialis 0 [1+] : left foot dorsalis pedis 1+ [Vibration Dec.] : diminished vibratory sensation at the level of the toes [Diminished Throughout Right Foot] : diminished sensation with monofilament testing throughout right foot [Diminished Throughout Left Foot] : diminished sensation with monofilament testing throughout left foot [FreeTextEntry3] : Absent hair growth. Thin, atrophic skin. The patient has a class finding of Q8-Two Class B findings.  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 She has tailor's bunion.  She has hammertoes and metatarsalgia and pain from arthritis.  [FreeTextEntry1] : The 2nd toe is preulcerative at the 1st interspace. The 3rd toe is painful at the tip.

## 2024-07-26 NOTE — ASSESSMENT
[FreeTextEntry1] : Impression: Horacio. Pain.  Treatment: I debrided keratotic lesions x2 without incidence, after prepping with alcohol. I gave her stretching exercises. I put aperture pads on and made a toe crest pad. I gave he the [employer] catalog.  We are not doing surgery. She could soak. She will follow my instructions and change shoe gear. Follow-up in the office with continued pain that persists.

## 2024-07-26 NOTE — PHYSICAL EXAM
[Delayed in the Right Toes] : capillary refills delayed in the right toes [Delayed in the Left Toes] : capillary refills delayed in the left toes [0] : left foot posterior tibialis 0 [1+] : left foot dorsalis pedis 1+ [Vibration Dec.] : diminished vibratory sensation at the level of the toes [Diminished Throughout Right Foot] : diminished sensation with monofilament testing throughout right foot [Diminished Throughout Left Foot] : diminished sensation with monofilament testing throughout left foot [FreeTextEntry3] : Absent hair growth. Thin, atrophic skin. The patient has a class finding of Q8-Two Class B findings.  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 She has tailor's bunion.  She has hammertoes and metatarsalgia and pain from arthritis.  [FreeTextEntry1] : The 2nd toe is preulcerative at the 1st interspace. The 3rd toe is painful at the tip.

## 2024-07-26 NOTE — PHYSICAL EXAM
[Delayed in the Right Toes] : capillary refills delayed in the right toes [Delayed in the Left Toes] : capillary refills delayed in the left toes [0] : left foot posterior tibialis 0 [1+] : left foot dorsalis pedis 1+ [Vibration Dec.] : diminished vibratory sensation at the level of the toes [Diminished Throughout Right Foot] : diminished sensation with monofilament testing throughout right foot [Diminished Throughout Left Foot] : diminished sensation with monofilament testing throughout left foot [FreeTextEntry3] : Absent hair growth. Thin, atrophic skin. The patient has a class finding of Q8-Two Class B findings.  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 She has tailor's bunion.  She has hammertoes and metatarsalgia and pain from arthritis.  [FreeTextEntry1] : The 2nd toe is preulcerative at the 1st interspace. The 3rd toe is painful at the tip.

## 2024-07-26 NOTE — HISTORY OF PRESENT ILLNESS
[FreeTextEntry1] : Patient presents today because of arthritic hammertoes and arthritic bunion, left.

## 2024-07-29 ENCOUNTER — OUTPATIENT (OUTPATIENT)
Dept: OUTPATIENT SERVICES | Facility: HOSPITAL | Age: 89
LOS: 1 days | End: 2024-07-29
Payer: MEDICARE

## 2024-07-29 ENCOUNTER — RESULT REVIEW (OUTPATIENT)
Age: 89
End: 2024-07-29

## 2024-07-29 ENCOUNTER — APPOINTMENT (OUTPATIENT)
Dept: MAMMOGRAPHY | Facility: IMAGING CENTER | Age: 89
End: 2024-07-29
Payer: MEDICARE

## 2024-07-29 ENCOUNTER — APPOINTMENT (OUTPATIENT)
Dept: ULTRASOUND IMAGING | Facility: IMAGING CENTER | Age: 89
End: 2024-07-29
Payer: MEDICARE

## 2024-07-29 DIAGNOSIS — C50.919 MALIGNANT NEOPLASM OF UNSPECIFIED SITE OF UNSPECIFIED FEMALE BREAST: ICD-10-CM

## 2024-07-29 PROCEDURE — 76641 ULTRASOUND BREAST COMPLETE: CPT | Mod: 26,50,GY

## 2024-07-29 PROCEDURE — 77066 DX MAMMO INCL CAD BI: CPT

## 2024-07-29 PROCEDURE — 77067 SCR MAMMO BI INCL CAD: CPT

## 2024-07-29 PROCEDURE — G0279: CPT

## 2024-07-29 PROCEDURE — G0279: CPT | Mod: 26

## 2024-07-29 PROCEDURE — 77063 BREAST TOMOSYNTHESIS BI: CPT

## 2024-07-29 PROCEDURE — 77066 DX MAMMO INCL CAD BI: CPT | Mod: 26

## 2024-07-29 PROCEDURE — 76641 ULTRASOUND BREAST COMPLETE: CPT

## 2024-08-01 ENCOUNTER — OUTPATIENT (OUTPATIENT)
Dept: OUTPATIENT SERVICES | Facility: HOSPITAL | Age: 88
LOS: 1 days | Discharge: HOME | End: 2024-08-01

## 2024-08-01 DIAGNOSIS — C50.919 MALIGNANT NEOPLASM OF UNSPECIFIED SITE OF UNSPECIFIED FEMALE BREAST: ICD-10-CM

## 2024-08-04 ENCOUNTER — NON-APPOINTMENT (OUTPATIENT)
Age: 89
End: 2024-08-04

## 2024-08-05 ENCOUNTER — APPOINTMENT (OUTPATIENT)
Dept: HEMATOLOGY ONCOLOGY | Facility: CLINIC | Age: 89
End: 2024-08-05

## 2024-08-05 PROCEDURE — 99214 OFFICE O/P EST MOD 30 MIN: CPT

## 2024-08-05 NOTE — ASSESSMENT
[Palliative] : Goals of care discussed with patient: Palliative [Palliative Care Plan] : not applicable at this time [FreeTextEntry1] : The patient will continue surveillance for history of recurrent breast carcinoma initially diagnosed in 2002 and 9/1/2022 she had recurrence and chose not to have resection because of her age.  She continues on letrozole and her recent mammogram and sonogram showed no progression of disease.  She continues to have calcifications in the left breast.  She will continue treatment for her osteoporosis with Prolia.  She also has diffuse arthritis and undergoes rheumatology follow-up with her primary care physician.  She also has increased nocturia every 2 hours and will discuss treatment with her primary care physician.  The patient will return in 12 months or sooner as needed.

## 2024-08-05 NOTE — REVIEW OF SYSTEMS
[Diarrhea: Grade 0] : Diarrhea: Grade 0 [Negative] : Allergic/Immunologic [FreeTextEntry9] : The patient undergoes rheumatology follow-up with Dr. Galindo her primary care physician for diffuse arthritis.  Patient also on Prolia for osteoporosis.

## 2024-08-05 NOTE — HISTORY OF PRESENT ILLNESS
[Disease: _____________________] : Disease: [unfilled] [T: ___] : T[unfilled] [N: ___] : N[unfilled] [M: ___] : M[unfilled] [AJCC Stage: ____] : AJCC Stage: [unfilled] [de-identified] : The patient is an 84-year-old woman with history of breast carcinoma. Her history dates back to November 2002 when on mammogram the patient was found to have bilateral breast distortion and a biopsy of the left breast revealed infiltrating ductal carcinoma measuring 2 cm which was ER +90%, TN +90%, HER-2/sravani negative and 2 sentinel lymph nodes were negative. The right breast biopsy revealed tubular carcinoma, ER and TN positive. The patient underwent bilateral lumpectomies, bilateral radiation therapy and treatment with tamoxifen for 3 years and then aromasin which was switched to  anastrozole. She completed 5 years of aromatase inhibitor treatment. 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  Biopsy however revealed positive for infiltrating duct carcinoma ER/TN positive and the patient now is on letrozole for the last 3 months.  She tolerates the treatment well and returns to discuss further management.  The patient has undergone consultation with her surgeon Dr. Nichols and the patient chose not to do surgery at this time.  She underwent a MRI of the breast which again shows the area of calcification in the left breast measuring 2.5 cm.  There is no adenopathy or disease in the other breast. [de-identified] : 2002- bilateral breast ca , Left -IDC er and MI ++, Roight- tubular caER and MI pos  [de-identified] : Rx bilateral lumpectomies and RT and hormonal therapy bob and AI. [de-identified] : The patient continues to do well and has no new symptoms indicating progression of disease.  She returns for follow-up on letrozole for recurrent carcinoma of the breast.

## 2024-08-05 NOTE — BEGINNING OF VISIT
[1] : 2) Feeling down, depressed, or hopeless for several days (1) [PHQ-9 Negative] : PHQ-9 Negative [SBP9Trybg] : 1 [Pain Scale: ___] : On a scale of 1-10, today the patient's pain is a(n) [unfilled]. [Never] : Never [Reviewed, no changes] : Reviewed, no changes [Diarrhea Character] : Diarrhea: Grade 0

## 2024-08-05 NOTE — HISTORY OF PRESENT ILLNESS
[Disease: _____________________] : Disease: [unfilled] [T: ___] : T[unfilled] [N: ___] : N[unfilled] [M: ___] : M[unfilled] [AJCC Stage: ____] : AJCC Stage: [unfilled] [de-identified] : The patient is an 84-year-old woman with history of breast carcinoma. Her history dates back to November 2002 when on mammogram the patient was found to have bilateral breast distortion and a biopsy of the left breast revealed infiltrating ductal carcinoma measuring 2 cm which was ER +90%, ME +90%, HER-2/sravani negative and 2 sentinel lymph nodes were negative. The right breast biopsy revealed tubular carcinoma, ER and ME positive. The patient underwent bilateral lumpectomies, bilateral radiation therapy and treatment with tamoxifen for 3 years and then aromasin which was switched to  anastrozole. She completed 5 years of aromatase inhibitor treatment. 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  Biopsy however revealed positive for infiltrating duct carcinoma ER/ME positive and the patient now is on letrozole for the last 3 months.  She tolerates the treatment well and returns to discuss further management.  The patient has undergone consultation with her surgeon Dr. Nichols and the patient chose not to do surgery at this time.  She underwent a MRI of the breast which again shows the area of calcification in the left breast measuring 2.5 cm.  There is no adenopathy or disease in the other breast. [de-identified] : 2002- bilateral breast ca , Left -IDC er and WA ++, Roight- tubular caER and WA pos  [de-identified] : Rx bilateral lumpectomies and RT and hormonal therapy bob and AI. [de-identified] : The patient continues to do well and has no new symptoms indicating progression of disease.  She returns for follow-up on letrozole for recurrent carcinoma of the breast. additional location...

## 2024-08-05 NOTE — BEGINNING OF VISIT
[1] : 2) Feeling down, depressed, or hopeless for several days (1) [PHQ-9 Negative] : PHQ-9 Negative [QOF2Orsph] : 1 [Pain Scale: ___] : On a scale of 1-10, today the patient's pain is a(n) [unfilled]. [Never] : Never [Reviewed, no changes] : Reviewed, no changes [Diarrhea Character] : Diarrhea: Grade 0

## 2024-09-18 ENCOUNTER — APPOINTMENT (OUTPATIENT)
Dept: PODIATRY | Facility: CLINIC | Age: 89
End: 2024-09-18

## 2024-09-18 DIAGNOSIS — M20.40 OTHER HAMMER TOE(S) (ACQUIRED), UNSPECIFIED FOOT: ICD-10-CM

## 2024-09-18 PROCEDURE — 99212 OFFICE O/P EST SF 10 MIN: CPT

## 2024-09-24 NOTE — PHYSICAL EXAM
[Delayed in the Right Toes] : capillary refills delayed in the right toes [Delayed in the Left Toes] : capillary refills delayed in the left toes [0] : left foot posterior tibialis 0 [1+] : left foot dorsalis pedis 1+ [Vibration Dec.] : diminished vibratory sensation at the level of the toes [Diminished Throughout Right Foot] : diminished sensation with monofilament testing throughout right foot [Diminished Throughout Left Foot] : diminished sensation with monofilament testing throughout left foot [FreeTextEntry3] : Absent hair growth. Thin, atrophic skin. The patient has a class finding of Q8-Two Class B findings.  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 [FreeTextEntry1] : Preulcerative keratotic lesions right 2nd and 3rd toes as well as the tailor's bunion.

## 2024-09-24 NOTE — PHYSICAL EXAM
[Delayed in the Right Toes] : capillary refills delayed in the right toes [Delayed in the Left Toes] : capillary refills delayed in the left toes [0] : left foot posterior tibialis 0 [1+] : left foot dorsalis pedis 1+ [Vibration Dec.] : diminished vibratory sensation at the level of the toes [Diminished Throughout Right Foot] : diminished sensation with monofilament testing throughout right foot [Diminished Throughout Left Foot] : diminished sensation with monofilament testing throughout left foot [FreeTextEntry3] : Absent hair growth. Thin, atrophic skin. The patient has a class finding of Q8-Two Class B findings.  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 [FreeTextEntry1] : Preulcerative keratotic lesions right 2nd and 3rd toes as well as the tailor's bunion.

## 2024-09-24 NOTE — PHYSICAL EXAM
[Delayed in the Right Toes] : capillary refills delayed in the right toes [Delayed in the Left Toes] : capillary refills delayed in the left toes [0] : left foot posterior tibialis 0 [1+] : left foot dorsalis pedis 1+ [Vibration Dec.] : diminished vibratory sensation at the level of the toes [Diminished Throughout Right Foot] : diminished sensation with monofilament testing throughout right foot [Diminished Throughout Left Foot] : diminished sensation with monofilament testing throughout left foot [FreeTextEntry3] : Absent hair growth. Thin, atrophic skin. The patient has a class finding of Q8-Two Class B findings.  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 [FreeTextEntry1] : Preulcerative keratotic lesions right 2nd and 3rd toes as well as the tailor's bunion.

## 2024-09-24 NOTE — ASSESSMENT
[FreeTextEntry1] : Impression: Hammertoes, right foot.   Treatment: Keratosis was prepped with alcohol and trimmed without incident. I put aperture pads on and different pads. I recommended different separators and protectors made of leather or foam, or just cotton balls. Discussed memory foam shoes and soaking with warm water and Epsom salt. She is not a candidate for surgery. She will follow-up for evaluation.

## 2024-09-24 NOTE — HISTORY OF PRESENT ILLNESS
[FreeTextEntry1] : Patient presents today because of arthritic hammertoes 2 and 3 on the right with right tailor's bunion. The arthritis is getting worse. She has severe pain. She is constantly putting pads on.

## 2024-10-21 ENCOUNTER — NON-APPOINTMENT (OUTPATIENT)
Age: 89
End: 2024-10-21

## 2024-11-27 ENCOUNTER — APPOINTMENT (OUTPATIENT)
Dept: PODIATRY | Facility: CLINIC | Age: 89
End: 2024-11-27

## 2024-11-27 DIAGNOSIS — B35.1 TINEA UNGUIUM: ICD-10-CM

## 2024-11-27 DIAGNOSIS — L85.1 ACQUIRED KERATOSIS [KERATODERMA] PALMARIS ET PLANTARIS: ICD-10-CM

## 2024-11-27 DIAGNOSIS — M79.675 PAIN IN RIGHT TOE(S): ICD-10-CM

## 2024-11-27 DIAGNOSIS — M79.674 PAIN IN RIGHT TOE(S): ICD-10-CM

## 2024-11-27 DIAGNOSIS — I70.91 GENERALIZED ATHEROSCLEROSIS: ICD-10-CM

## 2024-11-27 PROCEDURE — 11720 DEBRIDE NAIL 1-5: CPT | Mod: Q8,59

## 2024-11-27 PROCEDURE — 11056 PARNG/CUTG B9 HYPRKR LES 2-4: CPT | Mod: Q8

## 2025-03-05 ENCOUNTER — APPOINTMENT (OUTPATIENT)
Dept: PODIATRY | Facility: CLINIC | Age: 89
End: 2025-03-05

## 2025-03-05 DIAGNOSIS — I70.91 GENERALIZED ATHEROSCLEROSIS: ICD-10-CM

## 2025-03-05 DIAGNOSIS — L85.1 ACQUIRED KERATOSIS [KERATODERMA] PALMARIS ET PLANTARIS: ICD-10-CM

## 2025-03-05 DIAGNOSIS — B35.1 TINEA UNGUIUM: ICD-10-CM

## 2025-03-05 DIAGNOSIS — M79.674 PAIN IN RIGHT TOE(S): ICD-10-CM

## 2025-03-05 DIAGNOSIS — M79.675 PAIN IN RIGHT TOE(S): ICD-10-CM

## 2025-03-05 PROCEDURE — 11720 DEBRIDE NAIL 1-5: CPT | Mod: Q8,59

## 2025-03-05 PROCEDURE — 11056 PARNG/CUTG B9 HYPRKR LES 2-4: CPT | Mod: Q8

## 2025-04-14 ENCOUNTER — INPATIENT (INPATIENT)
Facility: HOSPITAL | Age: 89
LOS: 2 days | Discharge: NURSING, REHAB OR LONG-TERM CARE | DRG: 206 | End: 2025-04-17
Attending: SURGERY | Admitting: SURGERY
Payer: MEDICARE

## 2025-04-14 VITALS
DIASTOLIC BLOOD PRESSURE: 90 MMHG | SYSTOLIC BLOOD PRESSURE: 154 MMHG | HEIGHT: 57 IN | TEMPERATURE: 98 F | RESPIRATION RATE: 20 BRPM | WEIGHT: 89.95 LBS | OXYGEN SATURATION: 98 % | HEART RATE: 76 BPM

## 2025-04-14 DIAGNOSIS — R09.02 HYPOXEMIA: ICD-10-CM

## 2025-04-14 LAB
ALBUMIN SERPL ELPH-MCNC: 3.7 G/DL — SIGNIFICANT CHANGE UP (ref 3.3–5)
ALP SERPL-CCNC: 135 U/L — HIGH (ref 40–120)
ALT FLD-CCNC: 21 U/L — SIGNIFICANT CHANGE UP (ref 10–45)
ANION GAP SERPL CALC-SCNC: 16 MMOL/L — SIGNIFICANT CHANGE UP (ref 5–17)
ANION GAP SERPL CALC-SCNC: 19 MMOL/L — HIGH (ref 5–17)
ANISOCYTOSIS BLD QL: SLIGHT — SIGNIFICANT CHANGE UP
APTT BLD: 24 SEC — LOW (ref 24.5–35.6)
APTT BLD: 26.8 SEC — SIGNIFICANT CHANGE UP (ref 24.5–35.6)
AST SERPL-CCNC: 38 U/L — SIGNIFICANT CHANGE UP (ref 10–40)
BASOPHILS # BLD AUTO: 0.02 K/UL — SIGNIFICANT CHANGE UP (ref 0–0.2)
BASOPHILS # BLD AUTO: 0.12 K/UL — SIGNIFICANT CHANGE UP (ref 0–0.2)
BASOPHILS NFR BLD AUTO: 0.2 % — SIGNIFICANT CHANGE UP (ref 0–2)
BASOPHILS NFR BLD AUTO: 0.9 % — SIGNIFICANT CHANGE UP (ref 0–2)
BILIRUB SERPL-MCNC: 0.3 MG/DL — SIGNIFICANT CHANGE UP (ref 0.2–1.2)
BLD GP AB SCN SERPL QL: NEGATIVE — SIGNIFICANT CHANGE UP
BUN SERPL-MCNC: 39 MG/DL — HIGH (ref 7–23)
BUN SERPL-MCNC: 42 MG/DL — HIGH (ref 7–23)
BURR CELLS BLD QL SMEAR: PRESENT — SIGNIFICANT CHANGE UP
CALCIUM SERPL-MCNC: 8.7 MG/DL — SIGNIFICANT CHANGE UP (ref 8.4–10.5)
CALCIUM SERPL-MCNC: 9.3 MG/DL — SIGNIFICANT CHANGE UP (ref 8.4–10.5)
CHLORIDE SERPL-SCNC: 86 MMOL/L — LOW (ref 96–108)
CHLORIDE SERPL-SCNC: 86 MMOL/L — LOW (ref 96–108)
CO2 SERPL-SCNC: 23 MMOL/L — SIGNIFICANT CHANGE UP (ref 22–31)
CO2 SERPL-SCNC: 25 MMOL/L — SIGNIFICANT CHANGE UP (ref 22–31)
CREAT SERPL-MCNC: 1.08 MG/DL — SIGNIFICANT CHANGE UP (ref 0.5–1.3)
CREAT SERPL-MCNC: 1.17 MG/DL — SIGNIFICANT CHANGE UP (ref 0.5–1.3)
EGFR: 43 ML/MIN/1.73M2 — LOW
EGFR: 43 ML/MIN/1.73M2 — LOW
EGFR: 48 ML/MIN/1.73M2 — LOW
EGFR: 48 ML/MIN/1.73M2 — LOW
ELLIPTOCYTES BLD QL SMEAR: SLIGHT — SIGNIFICANT CHANGE UP
EOSINOPHIL # BLD AUTO: 0 K/UL — SIGNIFICANT CHANGE UP (ref 0–0.5)
EOSINOPHIL # BLD AUTO: 0 K/UL — SIGNIFICANT CHANGE UP (ref 0–0.5)
EOSINOPHIL NFR BLD AUTO: 0 % — SIGNIFICANT CHANGE UP (ref 0–6)
EOSINOPHIL NFR BLD AUTO: 0 % — SIGNIFICANT CHANGE UP (ref 0–6)
FLUAV AG NPH QL: SIGNIFICANT CHANGE UP
FLUBV AG NPH QL: SIGNIFICANT CHANGE UP
GAS PNL BLDV: SIGNIFICANT CHANGE UP
GAS PNL BLDV: SIGNIFICANT CHANGE UP
GLUCOSE SERPL-MCNC: 92 MG/DL — SIGNIFICANT CHANGE UP (ref 70–99)
GLUCOSE SERPL-MCNC: 98 MG/DL — SIGNIFICANT CHANGE UP (ref 70–99)
HCT VFR BLD CALC: 23.3 % — LOW (ref 34.5–45)
HCT VFR BLD CALC: 25.4 % — LOW (ref 34.5–45)
HCT VFR BLD CALC: 26.8 % — LOW (ref 34.5–45)
HGB BLD-MCNC: 8.1 G/DL — LOW (ref 11.5–15.5)
HGB BLD-MCNC: 8.5 G/DL — LOW (ref 11.5–15.5)
HGB BLD-MCNC: 9.1 G/DL — LOW (ref 11.5–15.5)
IMM GRANULOCYTES NFR BLD AUTO: 0.5 % — SIGNIFICANT CHANGE UP (ref 0–0.9)
INR BLD: 1.17 RATIO — HIGH (ref 0.85–1.16)
INR BLD: 1.19 RATIO — HIGH (ref 0.85–1.16)
LYMPHOCYTES # BLD AUTO: 0.6 K/UL — LOW (ref 1–3.3)
LYMPHOCYTES # BLD AUTO: 0.82 K/UL — LOW (ref 1–3.3)
LYMPHOCYTES # BLD AUTO: 4.3 % — LOW (ref 13–44)
LYMPHOCYTES # BLD AUTO: 7.5 % — LOW (ref 13–44)
MAGNESIUM SERPL-MCNC: 1.8 MG/DL — SIGNIFICANT CHANGE UP (ref 1.6–2.6)
MANUAL SMEAR VERIFICATION: SIGNIFICANT CHANGE UP
MCHC RBC-ENTMCNC: 29.3 PG — SIGNIFICANT CHANGE UP (ref 27–34)
MCHC RBC-ENTMCNC: 29.4 PG — SIGNIFICANT CHANGE UP (ref 27–34)
MCHC RBC-ENTMCNC: 30.2 PG — SIGNIFICANT CHANGE UP (ref 27–34)
MCHC RBC-ENTMCNC: 33.5 G/DL — SIGNIFICANT CHANGE UP (ref 32–36)
MCHC RBC-ENTMCNC: 34 G/DL — SIGNIFICANT CHANGE UP (ref 32–36)
MCHC RBC-ENTMCNC: 34.8 G/DL — SIGNIFICANT CHANGE UP (ref 32–36)
MCV RBC AUTO: 86.5 FL — SIGNIFICANT CHANGE UP (ref 80–100)
MCV RBC AUTO: 86.9 FL — SIGNIFICANT CHANGE UP (ref 80–100)
MCV RBC AUTO: 87.6 FL — SIGNIFICANT CHANGE UP (ref 80–100)
MICROCYTES BLD QL: SLIGHT — SIGNIFICANT CHANGE UP
MONOCYTES # BLD AUTO: 1.08 K/UL — HIGH (ref 0–0.9)
MONOCYTES # BLD AUTO: 1.36 K/UL — HIGH (ref 0–0.9)
MONOCYTES NFR BLD AUTO: 12.4 % — SIGNIFICANT CHANGE UP (ref 2–14)
MONOCYTES NFR BLD AUTO: 7.8 % — SIGNIFICANT CHANGE UP (ref 2–14)
NEUTROPHILS # BLD AUTO: 12.05 K/UL — HIGH (ref 1.8–7.4)
NEUTROPHILS # BLD AUTO: 8.71 K/UL — HIGH (ref 1.8–7.4)
NEUTROPHILS NFR BLD AUTO: 79.4 % — HIGH (ref 43–77)
NEUTROPHILS NFR BLD AUTO: 86.1 % — HIGH (ref 43–77)
NEUTS BAND # BLD: 0.9 % — SIGNIFICANT CHANGE UP (ref 0–8)
NEUTS BAND NFR BLD: 0.9 % — SIGNIFICANT CHANGE UP (ref 0–8)
NRBC BLD AUTO-RTO: 0 /100 WBCS — SIGNIFICANT CHANGE UP (ref 0–0)
NRBC BLD AUTO-RTO: 0 /100 WBCS — SIGNIFICANT CHANGE UP (ref 0–0)
NT-PROBNP SERPL-SCNC: 1810 PG/ML — HIGH (ref 0–300)
OVALOCYTES BLD QL SMEAR: SLIGHT — SIGNIFICANT CHANGE UP
PHOSPHATE SERPL-MCNC: 3 MG/DL — SIGNIFICANT CHANGE UP (ref 2.5–4.5)
PLAT MORPH BLD: ABNORMAL
PLATELET # BLD AUTO: 289 K/UL — SIGNIFICANT CHANGE UP (ref 150–400)
PLATELET # BLD AUTO: 317 K/UL — SIGNIFICANT CHANGE UP (ref 150–400)
PLATELET # BLD AUTO: 321 K/UL — SIGNIFICANT CHANGE UP (ref 150–400)
POIKILOCYTOSIS BLD QL AUTO: SLIGHT — SIGNIFICANT CHANGE UP
POLYCHROMASIA BLD QL SMEAR: SLIGHT — SIGNIFICANT CHANGE UP
POTASSIUM SERPL-MCNC: 3.3 MMOL/L — LOW (ref 3.5–5.3)
POTASSIUM SERPL-MCNC: 3.8 MMOL/L — SIGNIFICANT CHANGE UP (ref 3.5–5.3)
POTASSIUM SERPL-SCNC: 3.3 MMOL/L — LOW (ref 3.5–5.3)
POTASSIUM SERPL-SCNC: 3.8 MMOL/L — SIGNIFICANT CHANGE UP (ref 3.5–5.3)
PROT SERPL-MCNC: 6.4 G/DL — SIGNIFICANT CHANGE UP (ref 6–8.3)
PROTHROM AB SERPL-ACNC: 13.3 SEC — SIGNIFICANT CHANGE UP (ref 9.9–13.4)
PROTHROM AB SERPL-ACNC: 13.5 SEC — HIGH (ref 9.9–13.4)
RBC # BLD: 2.68 M/UL — LOW (ref 3.8–5.2)
RBC # BLD: 2.9 M/UL — LOW (ref 3.8–5.2)
RBC # BLD: 3.1 M/UL — LOW (ref 3.8–5.2)
RBC # FLD: 14.7 % — HIGH (ref 10.3–14.5)
RBC # FLD: 14.7 % — HIGH (ref 10.3–14.5)
RBC # FLD: 14.9 % — HIGH (ref 10.3–14.5)
RBC BLD AUTO: ABNORMAL
RH IG SCN BLD-IMP: POSITIVE — SIGNIFICANT CHANGE UP
RH IG SCN BLD-IMP: POSITIVE — SIGNIFICANT CHANGE UP
RSV RNA NPH QL NAA+NON-PROBE: SIGNIFICANT CHANGE UP
SARS-COV-2 RNA SPEC QL NAA+PROBE: SIGNIFICANT CHANGE UP
SODIUM SERPL-SCNC: 127 MMOL/L — LOW (ref 135–145)
SODIUM SERPL-SCNC: 128 MMOL/L — LOW (ref 135–145)
SOURCE RESPIRATORY: SIGNIFICANT CHANGE UP
TROPONIN T, HIGH SENSITIVITY RESULT: 39 NG/L — SIGNIFICANT CHANGE UP (ref 0–51)
WBC # BLD: 10.1 K/UL — SIGNIFICANT CHANGE UP (ref 3.8–10.5)
WBC # BLD: 10.96 K/UL — HIGH (ref 3.8–10.5)
WBC # BLD: 13.85 K/UL — HIGH (ref 3.8–10.5)
WBC # FLD AUTO: 10.1 K/UL — SIGNIFICANT CHANGE UP (ref 3.8–10.5)
WBC # FLD AUTO: 10.96 K/UL — HIGH (ref 3.8–10.5)
WBC # FLD AUTO: 13.85 K/UL — HIGH (ref 3.8–10.5)

## 2025-04-14 PROCEDURE — 71046 X-RAY EXAM CHEST 2 VIEWS: CPT | Mod: 26

## 2025-04-14 PROCEDURE — 32555 ASPIRATE PLEURA W/ IMAGING: CPT | Mod: LT

## 2025-04-14 PROCEDURE — 71275 CT ANGIOGRAPHY CHEST: CPT | Mod: 26

## 2025-04-14 PROCEDURE — 71045 X-RAY EXAM CHEST 1 VIEW: CPT | Mod: 26,XE

## 2025-04-14 PROCEDURE — 74177 CT ABD & PELVIS W/CONTRAST: CPT | Mod: 26

## 2025-04-14 PROCEDURE — 99285 EMERGENCY DEPT VISIT HI MDM: CPT | Mod: 25,GC

## 2025-04-14 PROCEDURE — 93010 ELECTROCARDIOGRAM REPORT: CPT

## 2025-04-14 RX ORDER — INFLUENZA A VIRUS A/IDAHO/07/2018 (H1N1) ANTIGEN (MDCK CELL DERIVED, PROPIOLACTONE INACTIVATED, INFLUENZA A VIRUS A/INDIANA/08/2018 (H3N2) ANTIGEN (MDCK CELL DERIVED, PROPIOLACTONE INACTIVATED), INFLUENZA B VIRUS B/SINGAPORE/INFTT-16-0610/2016 ANTIGEN (MDCK CELL DERIVED, PROPIOLACTONE INACTIVATED), INFLUENZA B VIRUS B/IOWA/06/2017 ANTIGEN (MDCK CELL DERIVED, PROPIOLACTONE INACTIVATED) 15; 15; 15; 15 UG/.5ML; UG/.5ML; UG/.5ML; UG/.5ML
0.5 INJECTION, SUSPENSION INTRAMUSCULAR ONCE
Refills: 0 | Status: DISCONTINUED | OUTPATIENT
Start: 2025-04-14 | End: 2025-04-17

## 2025-04-14 RX ORDER — LETROZOLE 2.5 MG/1
2.5 TABLET, FILM COATED ORAL DAILY
Refills: 0 | Status: DISCONTINUED | OUTPATIENT
Start: 2025-04-14 | End: 2025-04-17

## 2025-04-14 RX ORDER — ACETAMINOPHEN 500 MG/5ML
1000 LIQUID (ML) ORAL EVERY 6 HOURS
Refills: 0 | Status: COMPLETED | OUTPATIENT
Start: 2025-04-14 | End: 2025-04-15

## 2025-04-14 RX ORDER — LIDOCAINE HYDROCHLORIDE 20 MG/ML
1 JELLY TOPICAL EVERY 24 HOURS
Refills: 0 | Status: DISCONTINUED | OUTPATIENT
Start: 2025-04-14 | End: 2025-04-17

## 2025-04-14 RX ORDER — ATORVASTATIN CALCIUM 80 MG/1
20 TABLET, FILM COATED ORAL AT BEDTIME
Refills: 0 | Status: DISCONTINUED | OUTPATIENT
Start: 2025-04-14 | End: 2025-04-17

## 2025-04-14 RX ORDER — LEVOTHYROXINE SODIUM 300 MCG
50 TABLET ORAL
Refills: 0 | Status: DISCONTINUED | OUTPATIENT
Start: 2025-04-14 | End: 2025-04-17

## 2025-04-14 RX ORDER — LEVOTHYROXINE SODIUM 300 MCG
0 TABLET ORAL
Refills: 0 | DISCHARGE

## 2025-04-14 RX ORDER — CALCIUM GLUCONATE 20 MG/ML
2 INJECTION, SOLUTION INTRAVENOUS ONCE
Refills: 0 | Status: COMPLETED | OUTPATIENT
Start: 2025-04-14 | End: 2025-04-14

## 2025-04-14 RX ORDER — MAGNESIUM SULFATE 500 MG/ML
2 SYRINGE (ML) INJECTION ONCE
Refills: 0 | Status: COMPLETED | OUTPATIENT
Start: 2025-04-14 | End: 2025-04-14

## 2025-04-14 RX ORDER — LEVOTHYROXINE SODIUM 300 MCG
1 TABLET ORAL
Qty: 5 | Refills: 0
Start: 2025-04-14

## 2025-04-14 RX ORDER — B1/B2/B3/B5/B6/B12/VIT C/FOLIC 500-0.5 MG
1 TABLET ORAL DAILY
Refills: 0 | Status: DISCONTINUED | OUTPATIENT
Start: 2025-04-14 | End: 2025-04-17

## 2025-04-14 RX ORDER — METOPROLOL SUCCINATE 50 MG/1
25 TABLET, EXTENDED RELEASE ORAL EVERY 12 HOURS
Refills: 0 | Status: DISCONTINUED | OUTPATIENT
Start: 2025-04-14 | End: 2025-04-17

## 2025-04-14 RX ORDER — ACETAMINOPHEN 500 MG/5ML
600 LIQUID (ML) ORAL ONCE
Refills: 0 | Status: COMPLETED | OUTPATIENT
Start: 2025-04-14 | End: 2025-04-14

## 2025-04-14 RX ORDER — LEVOTHYROXINE SODIUM 300 MCG
100 TABLET ORAL
Refills: 0 | Status: DISCONTINUED | OUTPATIENT
Start: 2025-04-14 | End: 2025-04-17

## 2025-04-14 RX ADMIN — Medication 600 MILLIGRAM(S): at 20:54

## 2025-04-14 RX ADMIN — LIDOCAINE HYDROCHLORIDE 1 PATCH: 20 JELLY TOPICAL at 23:01

## 2025-04-14 RX ADMIN — Medication 25 GRAM(S): at 22:55

## 2025-04-14 RX ADMIN — Medication 240 MILLIGRAM(S): at 19:33

## 2025-04-14 RX ADMIN — Medication 40 MILLIEQUIVALENT(S): at 22:55

## 2025-04-14 RX ADMIN — ATORVASTATIN CALCIUM 20 MILLIGRAM(S): 80 TABLET, FILM COATED ORAL at 21:02

## 2025-04-14 RX ADMIN — CALCIUM GLUCONATE 200 GRAM(S): 20 INJECTION, SOLUTION INTRAVENOUS at 20:57

## 2025-04-14 NOTE — ED ADULT TRIAGE NOTE - CHIEF COMPLAINT QUOTE
shortness of breath, was 83% on room air per EMS  placed on nasal cannula and SPO2 improved to 98%  recently diagnosed with 2 rib fractures

## 2025-04-14 NOTE — ED ADULT NURSE NOTE - NS ED NURSE TRANSPORT WITH
Cardiac Monitor/Defib/ACLS/Rescue Kit/O2/BVM ED RN Rubio / PCA Raudel / MD Gardner (SICU)/Cardiac Monitor/Defib/ACLS/Rescue Kit/O2/BVM

## 2025-04-14 NOTE — ED PROVIDER NOTE - ATTENDING CONTRIBUTION TO CARE
I, Mich Joe MD, Emergency Medicine Attending Physician, personally saw and examined the patient and I personally made/approve the management plan and take responsibility for the patient management.    MDM: 89-year-old female with history as seen above who presents with shortness of breath and hypoxia.  Patient with remote fall at the end of March, and had x-rays confirming rib fractures with associated pain to the left chest wall, but shortness of breath did not start until yesterday.    On examination, patient with tachypnea and increased work of breathing and hypoxia.  Head NCAT, (+) tenderness to the left chest wall.  Cardiac examination RRR, lungs with decreased breath sounds to the left lung.  Abdomen is soft and nontender.  Neurovascularly intact in all 4 extremities.  Cranial nerves III-XII intact, no pronator drift, normal speech and gait.    Will obtain CT Chest to evaluate for acute thoracic pathology.  Will obtain CT Abd/Pelv to assess for acute intraabdominal surgical/traumatic pathology.  Will continue patient on supplemental O2.  Will obtain labs to evaluate for hematologic disorder, metabolic derangements, hepatic and renal function.

## 2025-04-14 NOTE — H&P ADULT - NSHPREVIEWOFSYSTEMS_GEN_ALL_CORE
REVIEW OF SYSTEMS:    CONSTITUTIONAL:  No weakness, fevers, or chills  EYES/ENT:  No visual changes, vertigo, or throat pain   NECK:  No pain or stiffness  RESPIRATORY:  +SOB, +cough, -wheezing, or -hemoptysis  CARDIOVASCULAR:  No chest pain or palpitations  GASTROINTESTINAL:  No abdominal pain, nausea, vomiting, or hematemesis; No diarrhea or constipation; No melena or hematochezia.  GENITOURINARY:  No dysuria, change in frequency, or hematuria  NEUROLOGICAL:  No numbness or weakness  SKIN:  No itching or rashes

## 2025-04-14 NOTE — ED ADULT NURSE NOTE - OBJECTIVE STATEMENT
89 yo F PMH osteoporosis, GERD, CHF presents to ED with c/o sob and low O2 saturation. Per EMS, pt had a fall Mar.28, with two L rib fractures. Pt became increasingly sob yesterday while eating, and O2 saturation was mid 80s after being seen, was referred to Ed by provider. Per EMS, pt placed on 3L prior to arrival with O2 saturation increasing to  "mid 90s". Pt endorses pain in ribs upon activity. Pt also endorses bilateral edema for the past few months. Pt A&Ox3, lieberman and following commands, breath sounds clear, speaking in full sentences, lieberman and following commands. Bed in lowest position with side rails raised, comfort measures provided.

## 2025-04-14 NOTE — H&P ADULT - ASSESSMENT
89F PMHx HTN, HLD, temporal arteritis, breast cancer in remission who had a mechanical fall on 03/28/25, now presenting with SOB found to have fractures of the left 7th-12th rubs and findings suggestive of a large left hemothorax with mild shift of the mediastinum      Plan:   - Admit to Dr. Minaya   - ICU Consult  - Pigtail catheter to be placed by ED  - Recommend follow-up CXR  - Patient would need thoracic surgery consult if hematoma remains s/p pigtail   - IS  - Pain Control   - PT    Discussed with Dr. Minaya   ACS

## 2025-04-14 NOTE — ED PROVIDER NOTE - OBJECTIVE STATEMENT
89 y/o female hx htn, hld, temporal arteritis, breast ca in remission on letrozole presents with shortness of breath. She had a mechanical fall on 3/28 that resulted in two rib fractures on the left side. She had been feeling well until yesterday when she began feeling short of breath. She endorses increased phlegm but no fever/chills, cough, n/v/d, abdominal pain, leg swelling. Her shortness of breath has been persistent, not worsening. On EMS arrival she was hypoxic to mid 80s, on 2L nasal cannula she improved to mid 90s.

## 2025-04-14 NOTE — ED PROVIDER NOTE - PROGRESS NOTE DETAILS
Mich Joe MD: My independent interpretation of the EKG shows:  Sinus rhythm with PACs with rate of 81 bpm, leftward axis deviation, no ST elevations or depressions.  QTc 450 Received signout pending CT imaging and likely trauma consult 89-year-old with a fall 3 weeks ago and now is short of breath with requiring oxygen on bedside ultrasound appears to have adequate collections in the left lung - Eric Hay MD attending physician CT w/ hemothorax w/ mediastinal shift, pigtail placed in left thorax w/ return of 2L blood, patient remained hemodynamically stable throughout procedure, WOB subjectively improved, will admit to SICU.

## 2025-04-14 NOTE — ED ADULT NURSE REASSESSMENT NOTE - NS ED NURSE REASSESS COMMENT FT1
1900mL of blood noted in pleura vac, pt at baseline mental status, breathing spontaneous with tachypnea noted, speaking in full sentences. Pt reports her breathing feels better, pt endorsing L rib pain, MD Reynolds aware, awaiting orders

## 2025-04-14 NOTE — ED ADULT NURSE NOTE - NSFALLHARMRISKINTERV_ED_ALL_ED

## 2025-04-14 NOTE — ED ADULT NURSE REASSESSMENT NOTE - NS ED NURSE REASSESS COMMENT FT1
MD Reynolds at bedside to place pigtail, pt verbalizes understanding of procedure, consent in chart. Suction set up at bedside, pt maintained on continuous pulse, CM, and BP. MD Reynolds and MD Hay at bedside to place pigtail, pt verbalizes understanding of procedure, consent in chart. Plaura vac set up to suction of 20 per MD Hay, pt maintained on continuous pulse, CM, and BP.

## 2025-04-14 NOTE — H&P ADULT - NSHPPHYSICALEXAM_GEN_ALL_CORE
Vital Signs Last 24 Hrs  T(C): 36.7 (14 Apr 2025 17:30), Max: 36.7 (14 Apr 2025 12:56)  T(F): 98 (14 Apr 2025 17:30), Max: 98 (14 Apr 2025 12:56)  HR: 69 (14 Apr 2025 18:51) (69 - 81)  BP: 140/65 (14 Apr 2025 18:51) (129/66 - 174/80)  BP(mean): --  RR: 24 (14 Apr 2025 18:51) (20 - 27)  SpO2: 96% (14 Apr 2025 18:51) (94% - 98%)    Parameters below as of 14 Apr 2025 18:51  Patient On (Oxygen Delivery Method): nasal cannula  O2 Flow (L/min): 4    PHYSICAL EXAM:  GENERAL: NAD, well-developed  HEAD:  Atraumatic, Normocephalic  EYES: EOMI, , conjunctiva and sclera clear  NECK: Supple, No JVD  CHEST/LUNG: shortness of breath, tenderness to palpation over the left chest   HEART: WWP, mentating  ABDOMEN: Soft, Nontender, Nondistended; Bowel sounds present  PSYCH: AAOx3  NEUROLOGY: non-focal  SKIN: No rashes or lesions

## 2025-04-14 NOTE — ED ADULT NURSE REASSESSMENT NOTE - NS ED NURSE REASSESS COMMENT FT1
Pt cleaned, changed, and placed in position of comfort 1715: Pt cleaned, changed, and placed in position of comfort

## 2025-04-14 NOTE — ED PROVIDER NOTE - CLINICAL SUMMARY MEDICAL DECISION MAKING FREE TEXT BOX
89 y/o female hx htn, hld, temporal arteritis, breast ca in remission on letrozole presents with shortness of breath. She is hemodynamically stable, afebrile, on exam she is resting comfortably in bed though appears to be using accessory muscles to breath with mild tachypnea w/ RR in low 20s. O2 95% on 3L nasal cannula. Mild tenderness to palpation left side chest wall. Breath sounds equal bilaterally. Will workup for pneumonia vs pneumothorax vs PE, closely monitor for oxygen requirements.

## 2025-04-14 NOTE — ED ADULT NURSE REASSESSMENT NOTE - NS ED NURSE REASSESS COMMENT FT1
Pt a&ox3, increased wob noted, speaking in full sentences, lieberman and following commands. Pt denies worsening sob or pain. Per MD Reynolds, pt stable to go to CT off telemonitoring. Pt a&ox3, increased wob noted, speaking in full sentences, lieberman and following commands. Pt denies worsening sob or pain. MD Reynodls aware. Per MD Reynolds, pt stable to go to CT off telemonitoring.

## 2025-04-14 NOTE — H&P ADULT - HISTORY OF PRESENT ILLNESS
89F PMHx HTN, HLD, temporal arteritis, breast cancer in remission who had a mechanical fall on 03/28/25. After the fall the patient went to her PCP who took a CXR and diagnosised her with rib fractures and recommended rest and Tylenol. Patient presenting to the ED with shortness of breath.  On EMS arrival she was hypoxic to mid 80s, on 2L nasal cannula she improved to mid 90s. As part of the workup the patient got a CT Chest which showed Fractures of the left seventh through the left 12th ribs and Findings suggestive of a large left hemothorax. Mild shift of the mediastinum towards the right is noted. Labs significant for WBC 10. Na 128.

## 2025-04-15 DIAGNOSIS — I10 ESSENTIAL (PRIMARY) HYPERTENSION: ICD-10-CM

## 2025-04-15 DIAGNOSIS — J94.2 HEMOTHORAX: ICD-10-CM

## 2025-04-15 DIAGNOSIS — E78.5 HYPERLIPIDEMIA, UNSPECIFIED: ICD-10-CM

## 2025-04-15 DIAGNOSIS — J96.01 ACUTE RESPIRATORY FAILURE WITH HYPOXIA: ICD-10-CM

## 2025-04-15 DIAGNOSIS — E87.1 HYPO-OSMOLALITY AND HYPONATREMIA: ICD-10-CM

## 2025-04-15 DIAGNOSIS — E03.9 HYPOTHYROIDISM, UNSPECIFIED: ICD-10-CM

## 2025-04-15 LAB
ANION GAP SERPL CALC-SCNC: 10 MMOL/L — SIGNIFICANT CHANGE UP (ref 5–17)
ANION GAP SERPL CALC-SCNC: 12 MMOL/L — SIGNIFICANT CHANGE UP (ref 5–17)
ANION GAP SERPL CALC-SCNC: 12 MMOL/L — SIGNIFICANT CHANGE UP (ref 5–17)
APTT BLD: 23.9 SEC — LOW (ref 24.5–35.6)
BUN SERPL-MCNC: 33 MG/DL — HIGH (ref 7–23)
BUN SERPL-MCNC: 33 MG/DL — HIGH (ref 7–23)
BUN SERPL-MCNC: 35 MG/DL — HIGH (ref 7–23)
CALCIUM SERPL-MCNC: 8.5 MG/DL — SIGNIFICANT CHANGE UP (ref 8.4–10.5)
CALCIUM SERPL-MCNC: 9.1 MG/DL — SIGNIFICANT CHANGE UP (ref 8.4–10.5)
CALCIUM SERPL-MCNC: 9.2 MG/DL — SIGNIFICANT CHANGE UP (ref 8.4–10.5)
CHLORIDE SERPL-SCNC: 86 MMOL/L — LOW (ref 96–108)
CHLORIDE SERPL-SCNC: 87 MMOL/L — LOW (ref 96–108)
CHLORIDE SERPL-SCNC: 92 MMOL/L — LOW (ref 96–108)
CO2 SERPL-SCNC: 25 MMOL/L — SIGNIFICANT CHANGE UP (ref 22–31)
CREAT SERPL-MCNC: 1.05 MG/DL — SIGNIFICANT CHANGE UP (ref 0.5–1.3)
CREAT SERPL-MCNC: 1.06 MG/DL — SIGNIFICANT CHANGE UP (ref 0.5–1.3)
CREAT SERPL-MCNC: 1.11 MG/DL — SIGNIFICANT CHANGE UP (ref 0.5–1.3)
EGFR: 46 ML/MIN/1.73M2 — LOW
EGFR: 46 ML/MIN/1.73M2 — LOW
EGFR: 49 ML/MIN/1.73M2 — LOW
GAS PNL BLDV: SIGNIFICANT CHANGE UP
GLUCOSE SERPL-MCNC: 102 MG/DL — HIGH (ref 70–99)
GLUCOSE SERPL-MCNC: 82 MG/DL — SIGNIFICANT CHANGE UP (ref 70–99)
GLUCOSE SERPL-MCNC: 97 MG/DL — SIGNIFICANT CHANGE UP (ref 70–99)
HCT VFR BLD CALC: 26.1 % — LOW (ref 34.5–45)
HGB BLD-MCNC: 8.8 G/DL — LOW (ref 11.5–15.5)
INR BLD: 1.16 RATIO — SIGNIFICANT CHANGE UP (ref 0.85–1.16)
MAGNESIUM SERPL-MCNC: 2.2 MG/DL — SIGNIFICANT CHANGE UP (ref 1.6–2.6)
MAGNESIUM SERPL-MCNC: 2.4 MG/DL — SIGNIFICANT CHANGE UP (ref 1.6–2.6)
MAGNESIUM SERPL-MCNC: 2.4 MG/DL — SIGNIFICANT CHANGE UP (ref 1.6–2.6)
MCHC RBC-ENTMCNC: 29.1 PG — SIGNIFICANT CHANGE UP (ref 27–34)
MCHC RBC-ENTMCNC: 33.7 G/DL — SIGNIFICANT CHANGE UP (ref 32–36)
MCV RBC AUTO: 86.4 FL — SIGNIFICANT CHANGE UP (ref 80–100)
NRBC BLD AUTO-RTO: 0 /100 WBCS — SIGNIFICANT CHANGE UP (ref 0–0)
OSMOLALITY SERPL: 274 MOSMOL/KG — LOW (ref 280–301)
OSMOLALITY UR: 610 MOS/KG — SIGNIFICANT CHANGE UP (ref 300–900)
PHOSPHATE SERPL-MCNC: 3.2 MG/DL — SIGNIFICANT CHANGE UP (ref 2.5–4.5)
PHOSPHATE SERPL-MCNC: 3.3 MG/DL — SIGNIFICANT CHANGE UP (ref 2.5–4.5)
PHOSPHATE SERPL-MCNC: 3.3 MG/DL — SIGNIFICANT CHANGE UP (ref 2.5–4.5)
PLATELET # BLD AUTO: 305 K/UL — SIGNIFICANT CHANGE UP (ref 150–400)
POTASSIUM SERPL-MCNC: 4.6 MMOL/L — SIGNIFICANT CHANGE UP (ref 3.5–5.3)
POTASSIUM SERPL-SCNC: 4.6 MMOL/L — SIGNIFICANT CHANGE UP (ref 3.5–5.3)
PROTHROM AB SERPL-ACNC: 13.2 SEC — SIGNIFICANT CHANGE UP (ref 9.9–13.4)
RBC # BLD: 3.02 M/UL — LOW (ref 3.8–5.2)
RBC # FLD: 14.8 % — HIGH (ref 10.3–14.5)
SODIUM SERPL-SCNC: 123 MMOL/L — LOW (ref 135–145)
SODIUM SERPL-SCNC: 124 MMOL/L — LOW (ref 135–145)
SODIUM SERPL-SCNC: 127 MMOL/L — LOW (ref 135–145)
SODIUM UR-SCNC: 6 MMOL/L — SIGNIFICANT CHANGE UP
WBC # BLD: 8.51 K/UL — SIGNIFICANT CHANGE UP (ref 3.8–10.5)
WBC # FLD AUTO: 8.51 K/UL — SIGNIFICANT CHANGE UP (ref 3.8–10.5)

## 2025-04-15 PROCEDURE — 71045 X-RAY EXAM CHEST 1 VIEW: CPT | Mod: 26

## 2025-04-15 PROCEDURE — 99223 1ST HOSP IP/OBS HIGH 75: CPT

## 2025-04-15 PROCEDURE — 99233 SBSQ HOSP IP/OBS HIGH 50: CPT | Mod: FS

## 2025-04-15 RX ORDER — DENOSUMAB 60 MG/ML
60 INJECTION SUBCUTANEOUS
Refills: 0 | DISCHARGE

## 2025-04-15 RX ORDER — FLUTICASONE PROPIONATE 50 UG/1
1 SPRAY, METERED NASAL
Refills: 0 | Status: DISCONTINUED | OUTPATIENT
Start: 2025-04-15 | End: 2025-04-17

## 2025-04-15 RX ORDER — PHENYLEPHRINE HCL IN 0.9% NACL 0.5 MG/5ML
0.5 SYRINGE (ML) INTRAVENOUS
Qty: 40 | Refills: 0 | Status: DISCONTINUED | OUTPATIENT
Start: 2025-04-15 | End: 2025-04-15

## 2025-04-15 RX ORDER — POLYETHYLENE GLYCOL 3350 17 G/17G
17 POWDER, FOR SOLUTION ORAL EVERY 24 HOURS
Refills: 0 | Status: DISCONTINUED | OUTPATIENT
Start: 2025-04-15 | End: 2025-04-17

## 2025-04-15 RX ORDER — ENOXAPARIN SODIUM 100 MG/ML
30 INJECTION SUBCUTANEOUS EVERY 24 HOURS
Refills: 0 | Status: DISCONTINUED | OUTPATIENT
Start: 2025-04-15 | End: 2025-04-17

## 2025-04-15 RX ORDER — SENNA 187 MG
2 TABLET ORAL AT BEDTIME
Refills: 0 | Status: DISCONTINUED | OUTPATIENT
Start: 2025-04-15 | End: 2025-04-17

## 2025-04-15 RX ADMIN — Medication 2 TABLET(S): at 21:14

## 2025-04-15 RX ADMIN — Medication 40 MILLIGRAM(S): at 05:27

## 2025-04-15 RX ADMIN — METOPROLOL SUCCINATE 25 MILLIGRAM(S): 50 TABLET, EXTENDED RELEASE ORAL at 18:37

## 2025-04-15 RX ADMIN — Medication 1 GRAM(S): at 18:38

## 2025-04-15 RX ADMIN — Medication 400 MILLIGRAM(S): at 05:26

## 2025-04-15 RX ADMIN — Medication 1000 MILLIGRAM(S): at 00:57

## 2025-04-15 RX ADMIN — Medication 1 TABLET(S): at 12:47

## 2025-04-15 RX ADMIN — LIDOCAINE HYDROCHLORIDE 1 PATCH: 20 JELLY TOPICAL at 07:00

## 2025-04-15 RX ADMIN — Medication 1 APPLICATION(S): at 21:15

## 2025-04-15 RX ADMIN — Medication 1000 MILLIGRAM(S): at 12:51

## 2025-04-15 RX ADMIN — METOPROLOL SUCCINATE 25 MILLIGRAM(S): 50 TABLET, EXTENDED RELEASE ORAL at 05:27

## 2025-04-15 RX ADMIN — Medication 400 MILLIGRAM(S): at 12:47

## 2025-04-15 RX ADMIN — Medication 400 MILLIGRAM(S): at 18:37

## 2025-04-15 RX ADMIN — ENOXAPARIN SODIUM 30 MILLIGRAM(S): 100 INJECTION SUBCUTANEOUS at 12:46

## 2025-04-15 RX ADMIN — Medication 1000 MILLIGRAM(S): at 06:21

## 2025-04-15 RX ADMIN — Medication 100 MICROGRAM(S): at 05:27

## 2025-04-15 RX ADMIN — POLYETHYLENE GLYCOL 3350 17 GRAM(S): 17 POWDER, FOR SOLUTION ORAL at 18:38

## 2025-04-15 RX ADMIN — Medication 400 MILLIGRAM(S): at 00:17

## 2025-04-15 RX ADMIN — LIDOCAINE HYDROCHLORIDE 1 PATCH: 20 JELLY TOPICAL at 11:49

## 2025-04-15 RX ADMIN — ATORVASTATIN CALCIUM 20 MILLIGRAM(S): 80 TABLET, FILM COATED ORAL at 21:15

## 2025-04-15 RX ADMIN — LETROZOLE 2.5 MILLIGRAM(S): 2.5 TABLET, FILM COATED ORAL at 12:47

## 2025-04-15 RX ADMIN — Medication 40 MILLIEQUIVALENT(S): at 03:30

## 2025-04-15 RX ADMIN — Medication 1 GRAM(S): at 05:27

## 2025-04-15 RX ADMIN — LIDOCAINE HYDROCHLORIDE 1 PATCH: 20 JELLY TOPICAL at 23:01

## 2025-04-15 RX ADMIN — Medication 1000 MILLIGRAM(S): at 19:07

## 2025-04-15 RX ADMIN — Medication 1500 MILLILITER(S): at 22:21

## 2025-04-15 NOTE — PHYSICAL THERAPY INITIAL EVALUATION ADULT - PLANNED THERAPY INTERVENTIONS, PT EVAL
stair neg: GOAL: pt will neg 4 steps 1 HR in 4wks./bed mobility training/gait training/strengthening/transfer training

## 2025-04-15 NOTE — DIETITIAN INITIAL EVALUATION ADULT - OTHER INFO
GI/INTAKE:   -Reports 100% intake of breakfast   -Agreeable to Ensure Max (lower calorie version of Ensure)   -Last BM documented ; bowel regimen ordered (Miralax, Senna)     RENAL:   -Hyponatremic     PULM/RESP:   -Hemothorax; chest tube in place     MSK:   -Rib fx s/p fall     CHEMO/IMMUNO:   -Hx of breast CA  -Home immunotherapy regimen ordered in-house    WEIGHT HX:   -Current dosin pounds   -Denies any recent weight loss

## 2025-04-15 NOTE — CONSULT NOTE ADULT - PROBLEM SELECTOR RECOMMENDATION 3
Na decreased, likely due to poor oral intake.  Encourage oral intake  Started on salt tablets by SICU.   Monitor BMP Na decreased, likely due to poor oral intake, HCTZ use  Encourage oral intake  Started on salt tablets by SICU.   Monitor BMP

## 2025-04-15 NOTE — CONSULT NOTE ADULT - CONVERSATION DETAILS
Introduced self and team and role in care of patient. Reviewed events leading to patient's current status, and overall condition at present. Explained role of healthcare proxy and its role in decision making should the patient be unable to express his own wishes or lacks capacity to make medical decisions. Explained Family Health Care Decision Act (FHCDA) Surrogate Decision Maker Hierarchy in the absence of a health care agent. Patient reports she has no spouse/children - her next of kin and healthcare proxy/surrogate is her sister, Leona Conde. Discussed treatment preferences, including chest compression and intubation - patient reports she would want resuscitative measures. Remains FULL CODE.

## 2025-04-15 NOTE — PROGRESS NOTE ADULT - SUBJECTIVE AND OBJECTIVE BOX
24 HOUR EVENTS:  - pigtail output approx 2L old+new blood    NEURO  RASS (if intubated): 		CAM ICU (if concern for delirium):  Exam: AOx4  Meds: acetaminophen   IVPB .. 1000 milliGRAM(s) IV Intermittent every 6 hours      RESPIRATORY  RR: 25 (04-15-25 @ 00:00) (17 - 28)  SpO2: 100% (04-15-25 @ 00:00) (94% - 100%)  Wt(kg): --  Exam: Lungs CTA b/l  Mechanical Ventilation:     Meds:     CARDIOVASCULAR  HR: 69 (04-15-25 @ 00:00) (66 - 81)  BP: 143/66 (04-15-25 @ 00:00) (104/58 - 174/80)  BP(mean): 95 (04-15-25 @ 00:00) (77 - 95)  ABP: --  ABP(mean): --  Wt(kg): --  CVP(cm H2O): --  VBG - ( 14 Apr 2025 20:25 )  pH: 7.33  /  pCO2: 42    /  pO2: 32    / HCO3: 22    / Base Excess: -3.5  /  SaO2: 49.0   Lactate: 0.9                Exam: Normal S1/S2 w/o murmurs or rubs  Cardiac Rhythm: sinus  Perfusion     [x ]Adequate   [ ]Inadequate  Mentation   [ x]Normal       [ ]Reduced  Extremities  [x ]Warm         [ ]Cool  Volume Status [ ]Hypervolemic [x ]Euvolemic [ ]Hypovolemic  Meds: metoprolol tartrate 25 milliGRAM(s) Oral every 12 hours      GI/NUTRITION  Exam: abd non distended, non TTP  Diet: regular  Last Bowel Movement: 12-Apr-2025 (04-14-25 @ 19:46)    Meds: pantoprazole    Tablet 40 milliGRAM(s) Oral before breakfast      GENITOURINARY  I&O's Detail    04-14 @ 07:01  -  04-15 @ 00:45  --------------------------------------------------------  IN:    IV PiggyBack: 250 mL    Oral Fluid: 240 mL  Total IN: 490 mL    OUT:    Chest Tube (mL): 2110 mL    Voided (mL): 170 mL  Total OUT: 2280 mL    Total NET: -1790 mL        Weight (kg): 50.5 (04-14 @ 19:46)  04-14    127[L]  |  86[L]  |  39[H]  ----------------------------<  98  3.3[L]   |  25  |  1.08    Ca    8.7      14 Apr 2025 20:33  Phos  3.0     04-14  Mg     1.8     04-14    TPro  6.4  /  Alb  3.7  /  TBili  0.3  /  DBili  x   /  AST  38  /  ALT  21  /  AlkPhos  135[H]  04-14    Meds: multivitamin 1 Tablet(s) Oral daily  potassium chloride    Tablet ER 40 milliEquivalent(s) Oral every 4 hours  sodium chloride 1 Gram(s) Oral two times a day      HEMATOLOGIC  Meds:                         8.1    10.10 )-----------( 289      ( 14 Apr 2025 20:33 )             23.3     PT/INR - ( 14 Apr 2025 20:33 )   PT: 13.5 sec;   INR: 1.19 ratio         PTT - ( 14 Apr 2025 20:33 )  PTT:24.0 sec    INFECTIOUS DISEASES  T(C): 36.6 (04-14-25 @ 23:00), Max: 36.7 (04-14-25 @ 12:56)  Wt(kg): --  WBC Count: 10.10 K/uL (04-14 @ 20:33)  WBC Count: 10.96 K/uL (04-14 @ 18:37)  WBC Count: 13.85 K/uL (04-14 @ 14:06)    Recent Cultures:    Meds: influenza  Vaccine (HIGH DOSE) 0.5 milliLiter(s) IntraMuscular once      ENDOCRINE  Capillary Blood Glucose    Meds: atorvastatin 20 milliGRAM(s) Oral at bedtime  levothyroxine 100 MICROGram(s) Oral <User Schedule>  levothyroxine 50 MICROGram(s) Oral <User Schedule>      ACCESS DEVICES:  [ ] Peripheral IV  [ ] Central Venous Line		[ ] R	[ ] L	[ ] IJ	[ ] Fem	[ ] SC	Placed:   [ ] Arterial Line			[ ] R	[ ] L	[ ] Fem	[ ] Rad	[ ] Ax	Placed:   [ ] PICC:					[ ] Mediport  [ ] Urinary Catheter, Date Placed:   [ ] Necessity of urinary, arterial, and venous catheters discussed    OTHER MEDICATIONS:  letrozole 2.5 milliGRAM(s) Oral daily  lidocaine   4% Patch 1 Patch Transdermal every 24 hours      IMAGING:

## 2025-04-15 NOTE — DIETITIAN INITIAL EVALUATION ADULT - ORAL INTAKE PTA/DIET HISTORY
PTA per pt  -Intake: moderate PO intake at baseline; consumes breakfast and dinner with occasional Ensure shake for lunch   -Chewing/Swallowing: denies difficulty   -Allergies/Intolerances: NKFA

## 2025-04-15 NOTE — DIETITIAN INITIAL EVALUATION ADULT - ADD RECOMMEND
1) Regular diet   2) Ensure Max (Chocolate) x1   3) multivitamin, vitamin C   4) Monitor PO intake, diet tolerance, weight trends, labs, GI function, and skin integrity  5) Malnutrition sticker placed     Micki Bishop MS, RDN, CDN ([employer])

## 2025-04-15 NOTE — DIETITIAN INITIAL EVALUATION ADULT - PERTINENT LABORATORY DATA
04-15    123[L]  |  86[L]  |  33[H]  ----------------------------<  82  4.6   |  25  |  1.06    Ca    9.2      15 Apr 2025 06:15  Phos  3.3     04-15  Mg     2.4     04-15    TPro  6.4  /  Alb  3.7  /  TBili  0.3  /  DBili  x   /  AST  38  /  ALT  21  /  AlkPhos  135[H]  04-14

## 2025-04-15 NOTE — PHYSICAL THERAPY INITIAL EVALUATION ADULT - PERTINENT HX OF CURRENT PROBLEM, REHAB EVAL
Pt is a 89F admitted to [facility] on 4/14/25 PMHx HTN, HLD, temporal arteritis, breast cancer in remission who had a mechanical fall on 03/28/25. After the fall the pt went to her PCP who took a CXR and diagnosised her with rib fractures and recommended rest and Tylenol. Pt presenting to the ED with shortness of breath.  On EMS arrival she was hypoxic to mid 80s, on 2L nasal cannula she improved to mid 90s. As part of the workup the pt got a CT Chest which showed Fractures of the left seventh through the left 12th ribs and Findings suggestive of a large left hemothorax. Mild shift of the mediastinum towards the right is noted. Labs significant for WBC 10. Na 128. Pt is admitted to SICU for hemodynamic monitoring, respiratory observation, and management of hemothorax. +Left 7-12 rib fractures, +Left hemothorax with midline mediastinal shift. CTA chest: No pulmonary embolus is noted. Fractures of the left seventh through the left 12th ribs. Findings suggestive of a large left hemothorax. Mild shift of the mediastinum towards the right is noted. CXR: Small left pleural effusion with underlying patchy opacities. Left-sided pigtail catheter. + S/p pigtail with 2L of sanguinous (mixed new and old) output, w/ immediate improvement in resp status, RVP negative (4/14)

## 2025-04-15 NOTE — DIETITIAN INITIAL EVALUATION ADULT - PERTINENT MEDS FT
MEDICATIONS  (STANDING):  acetaminophen   IVPB .. 1000 milliGRAM(s) IV Intermittent every 6 hours  atorvastatin 20 milliGRAM(s) Oral at bedtime  chlorhexidine 2% Cloths 1 Application(s) Topical <User Schedule>  enoxaparin Injectable 30 milliGRAM(s) SubCutaneous every 24 hours  hydrochlorothiazide 25 milliGRAM(s) Oral daily  influenza  Vaccine (HIGH DOSE) 0.5 milliLiter(s) IntraMuscular once  letrozole 2.5 milliGRAM(s) Oral daily  levothyroxine 100 MICROGram(s) Oral <User Schedule>  levothyroxine 50 MICROGram(s) Oral <User Schedule>  lidocaine   4% Patch 1 Patch Transdermal every 24 hours  metoprolol tartrate 25 milliGRAM(s) Oral every 12 hours  multivitamin 1 Tablet(s) Oral daily  pantoprazole    Tablet 40 milliGRAM(s) Oral before breakfast  polyethylene glycol 3350 17 Gram(s) Oral every 24 hours  senna 2 Tablet(s) Oral at bedtime  sodium chloride 1 Gram(s) Oral two times a day    MEDICATIONS  (PRN):  fluticasone propionate 50 MICROgram(s)/spray Nasal Spray 1 Spray(s) Both Nostrils two times a day PRN Congestion

## 2025-04-15 NOTE — CONSULT NOTE ADULT - PROBLEM SELECTOR RECOMMENDATION 2
Likely due to injuries as above  Continue supplemental oxygen as tolerated. Wean as able. Goal 94-98%

## 2025-04-15 NOTE — PHYSICAL THERAPY INITIAL EVALUATION ADULT - ADDITIONAL COMMENTS
Pt lives at home with sister in a garden apartment, +4+2 steps to enter, +2 steps inside, Prior to fall, pt ambulates independently, occ use of cane; ind ADLs, recently since fall at end of March, pts sister has been assisting with ADLs- lower body dressing/showers/ambulating; +tub shower, +wears reading glasses.

## 2025-04-15 NOTE — CONSULT NOTE ADULT - NSCONSULTADDITIONALINFOA_GEN_ALL_CORE
Time-based billing (NON-critical care).     79 minutes spent on total encounter. The necessity of the time spent during the encounter on this date of service was due to:     - Reviewing, and interpreting labs, testing, and imaging.  - Independently obtaining a review of systems and performing a physical exam  - Reviewing prior records and where necessary, outpatient records.  - Counselling and educating patient regarding interpretation of aforementioned items and plan of care.    Discussed with SICU

## 2025-04-15 NOTE — DIETITIAN INITIAL EVALUATION ADULT - NSFNSGIIOFT_GEN_A_CORE
04-14-25 @ 07:01  -  04-15-25 @ 07:00  --------------------------------------------------------  OUT:    Chest Tube (mL): 2210 mL  Total OUT: 2210 mL    Total NET: -2210 mL

## 2025-04-15 NOTE — PHYSICAL THERAPY INITIAL EVALUATION ADULT - GENERAL OBSERVATIONS, REHAB EVAL
Pt is s/p mechanical fall on 3/28/2025, +rib fractures, recommended pain management and rest.  Pt now had acute worsening of SOB and increased work of breathing, hypoxic to mid 80s, presented to ED with CT chest showing evidence of left 7 through 12 rib fractures and large left hemothorax with mid shifted mediastinum. Pt had pigtail chest tube placed with 2L of mixed old blood removed, +marked improvement of respiratory effort after pigtail placement, +admitted to SICU for hemodynamic monitoring, respiratory observation, and management of hemothorax. Cleared by SABRINA Andrade for PT eval. Pt received supine in bed, +ICU monitoring, +IVL, +O2 via NC 3-4LO2 NC, +purewick, +L pigtail catheter.

## 2025-04-15 NOTE — CONSULT NOTE ADULT - PROBLEM SELECTOR RECOMMENDATION 4
At home, takes metoprolol and HCTZ   Both may increase fall risk for older adults - if on metoprolol and HCTZ for primary HTN only, would recommend holding and starting alternative medication - amlodipine 5mg daily.   Monitor BP At home, takes metoprolol and HCTZ   Both may increase fall risk for older adults - if on metoprolol and HCTZ for primary HTN only, would recommend holding and starting alternative medication - amlodipine 5mg daily. HCTZ will also worsen hyponatremia  Monitor BP

## 2025-04-15 NOTE — PHYSICAL THERAPY INITIAL EVALUATION ADULT - NSPTDISCHREC_GEN_A_CORE
DC subacute rehab; if pt to go home, home PT services, assist from elderly sister as needed, recommend rolling walker, Pt will require a rolling walker at home due to their diagnosis of rib fractures and hemothorax to help complete MRADLs (mobility related aides of daily living), owns cane, recommend shower chair, ERWIN Walker aware./Sub-acute Rehab

## 2025-04-15 NOTE — CONSULT NOTE ADULT - PROBLEM SELECTOR RECOMMENDATION 9
Further management, including DVT ppx, per primary SICU team.   Continue multi-modal pain control. Continue bowel regimen  Encourage early mobility and incentive spirometer use.

## 2025-04-15 NOTE — DIETITIAN INITIAL EVALUATION ADULT - REASON FOR ADMISSION
"89-year-old female with past medical history of HTN, HLD, temporal arteritis, breast cancer in remission, suffered mechanical fall on 3/28/2025.  After fall presented to PCP who diagnosed her with rib fractures, recommended pain management and rest.  Patient now had acute worsening of shortness of breath and increased work of breathing, hypoxic to mid 80s, presented to ED with CT chest showing evidence of left 7 through 12 rib fractures and large left hemothorax with mid shifted mediastinum.  Patient had pigtail chest tube placed with 2 L of mixed old blood removed.  Patient with marked improvement of respiratory effort after pigtail placement.  Patient is admitted to SICU for hemodynamic monitoring, respiratory observation, and management of hemothorax."

## 2025-04-15 NOTE — PROGRESS NOTE ADULT - SUBJECTIVE AND OBJECTIVE BOX
Trauma Surgery Progress Note    SUBJECTIVE  The patient was seen and examined. No acute events overnight. Patient feels breathing is improved, on 3LNC.    OBJECTIVE  ___________________________________________________  VITAL SIGNS / I&O's   Vital Signs Last 24 Hrs  T(C): 36.7 (15 Apr 2025 07:00), Max: 36.7 (14 Apr 2025 12:56)  T(F): 98.1 (15 Apr 2025 07:00), Max: 98.1 (15 Apr 2025 07:00)  HR: 61 (15 Apr 2025 09:00) (61 - 81)  BP: 123/60 (15 Apr 2025 09:00) (104/58 - 174/80)  BP(mean): 87 (15 Apr 2025 09:00) (76 - 95)  RR: 38 (15 Apr 2025 09:00) (17 - 38)  SpO2: 99% (15 Apr 2025 09:00) (94% - 100%)    Parameters below as of 15 Apr 2025 07:00  Patient On (Oxygen Delivery Method): nasal cannula  O2 Flow (L/min): 3        14 Apr 2025 07:01  -  15 Apr 2025 07:00  --------------------------------------------------------  IN:    IV PiggyBack: 350 mL    Oral Fluid: 480 mL  Total IN: 830 mL    OUT:    Chest Tube (mL): 2210 mL    Voided (mL): 590 mL  Total OUT: 2800 mL    Total NET: -1970 mL      15 Apr 2025 07:01  -  15 Apr 2025 12:44  --------------------------------------------------------  IN:    Oral Fluid: 200 mL  Total IN: 200 mL    OUT:  Total OUT: 0 mL    Total NET: 200 mL        ___________________________________________________  PHYSICAL EXAM    -- CONSTITUTIONAL: NAD, lying in bed  -- NEURO: Awake, alert  -- HEENT: NC/AT, mucous membranes moist  -- NECK: Soft, no asymmetry  -- CHEST: breathing comfortably on 3LNC, ttp L chest. L pigtail in place.  -- PULM: Non-labored respirations, equal chest rise bilaterally  -- ABDOMEN: Nondistended  -- EXTREMITIES: Warm and well perfused  -- PSYCH: Affect normal, A&Ox3    ___________________________________________________  LABS                        8.8    8.51  )-----------( 305      ( 15 Apr 2025 06:15 )             26.1     15 Apr 2025 06:15    123    |  86     |  33     ----------------------------<  82     4.6     |  25     |  1.06     Ca    9.2        15 Apr 2025 06:15  Phos  3.3       15 Apr 2025 06:15  Mg     2.4       15 Apr 2025 06:15    TPro  6.4    /  Alb  3.7    /  TBili  0.3    /  DBili  x      /  AST  38     /  ALT  21     /  AlkPhos  135    14 Apr 2025 14:06    PT/INR - ( 15 Apr 2025 06:15 )   PT: 13.2 sec;   INR: 1.16 ratio         PTT - ( 15 Apr 2025 06:15 )  PTT:23.9 sec  CAPILLARY BLOOD GLUCOSE            Urinalysis Basic - ( 15 Apr 2025 06:15 )    Color: x / Appearance: x / SG: x / pH: x  Gluc: 82 mg/dL / Ketone: x  / Bili: x / Urobili: x   Blood: x / Protein: x / Nitrite: x   Leuk Esterase: x / RBC: x / WBC x   Sq Epi: x / Non Sq Epi: x / Bacteria: x      ___________________________________________________  MICRO  Recent Cultures:    ___________________________________________________  MEDICATIONS  (STANDING):  acetaminophen   IVPB .. 1000 milliGRAM(s) IV Intermittent every 6 hours  atorvastatin 20 milliGRAM(s) Oral at bedtime  chlorhexidine 2% Cloths 1 Application(s) Topical <User Schedule>  enoxaparin Injectable 30 milliGRAM(s) SubCutaneous every 24 hours  hydrochlorothiazide 25 milliGRAM(s) Oral daily  influenza  Vaccine (HIGH DOSE) 0.5 milliLiter(s) IntraMuscular once  letrozole 2.5 milliGRAM(s) Oral daily  levothyroxine 100 MICROGram(s) Oral <User Schedule>  levothyroxine 50 MICROGram(s) Oral <User Schedule>  lidocaine   4% Patch 1 Patch Transdermal every 24 hours  metoprolol tartrate 25 milliGRAM(s) Oral every 12 hours  multivitamin 1 Tablet(s) Oral daily  pantoprazole    Tablet 40 milliGRAM(s) Oral before breakfast  polyethylene glycol 3350 17 Gram(s) Oral every 24 hours  senna 2 Tablet(s) Oral at bedtime  sodium chloride 1 Gram(s) Oral two times a day    MEDICATIONS  (PRN):  fluticasone propionate 50 MICROgram(s)/spray Nasal Spray 1 Spray(s) Both Nostrils two times a day PRN Congestion

## 2025-04-15 NOTE — CHART NOTE - NSCHARTNOTEFT_GEN_A_CORE
Tertiary Trauma Survey (TTS)    Date of TTS: 04/15/25               Time:   Admit Date:                              Trauma Activation:  Admit GCS: E-     V-     M-     HPI:  89F PMHx HTN, HLD, temporal arteritis, breast cancer in remission who had a mechanical fall on 03/28/25. After the fall the patient went to her PCP who took a CXR and diagnosed her with rib fractures and recommended rest and Tylenol. Patient presenting to the ED with shortness of breath.  On EMS arrival she was hypoxic to mid 80s, on 2L nasal cannula she improved to mid 90s. As part of the workup the patient got a CT Chest which showed Fractures of the left seventh through the left 12th ribs and Findings suggestive of a large left hemothorax. Mild shift of the mediastinum towards the right is noted. Labs significant for WBC 10. Na 128.     (14 Apr 2025 16:17)      PAST MEDICAL & SURGICAL HISTORY:    [  ] No significant past history as reviewed with the patient and family    FAMILY HISTORY:    [  ] Family history not pertinent as reviewed with the patient and family    SOCIAL HISTORY:    Medications (inpatient): acetaminophen   IVPB .. 1000 milliGRAM(s) IV Intermittent every 6 hours  atorvastatin 20 milliGRAM(s) Oral at bedtime  chlorhexidine 2% Cloths 1 Application(s) Topical <User Schedule>  enoxaparin Injectable 30 milliGRAM(s) SubCutaneous every 24 hours  hydrochlorothiazide 25 milliGRAM(s) Oral daily  influenza  Vaccine (HIGH DOSE) 0.5 milliLiter(s) IntraMuscular once  letrozole 2.5 milliGRAM(s) Oral daily  levothyroxine 100 MICROGram(s) Oral <User Schedule>  levothyroxine 50 MICROGram(s) Oral <User Schedule>  lidocaine   4% Patch 1 Patch Transdermal every 24 hours  metoprolol tartrate 25 milliGRAM(s) Oral every 12 hours  multivitamin 1 Tablet(s) Oral daily  pantoprazole    Tablet 40 milliGRAM(s) Oral before breakfast  polyethylene glycol 3350 17 Gram(s) Oral every 24 hours  senna 2 Tablet(s) Oral at bedtime  sodium chloride 1 Gram(s) Oral two times a day    Medications (PRN):fluticasone propionate 50 MICROgram(s)/spray Nasal Spray 1 Spray(s) Both Nostrils two times a day PRN    Allergies: No Known Allergies  (Intolerances: )    Vital Signs Last 24 Hrs  T(C): 36.7 (15 Apr 2025 07:00), Max: 36.7 (14 Apr 2025 17:30)  T(F): 98.1 (15 Apr 2025 07:00), Max: 98.1 (15 Apr 2025 07:00)  HR: 61 (15 Apr 2025 09:00) (61 - 78)  BP: 123/60 (15 Apr 2025 09:00) (104/58 - 168/80)  BP(mean): 87 (15 Apr 2025 09:00) (76 - 95)  RR: 38 (15 Apr 2025 09:00) (17 - 38)  SpO2: 99% (15 Apr 2025 09:00) (95% - 100%)    Parameters below as of 15 Apr 2025 07:00  Patient On (Oxygen Delivery Method): nasal cannula  O2 Flow (L/min): 3    Drug Dosing Weight  Height (cm): 144.8 (14 Apr 2025 19:46)  Weight (kg): 50.5 (14 Apr 2025 19:46)  BMI (kg/m2): 24.1 (14 Apr 2025 19:46)  BSA (m2): 1.4 (14 Apr 2025 19:46)                          8.8    8.51  )-----------( 305      ( 15 Apr 2025 06:15 )             26.1     04-15    124[L]  |  87[L]  |  33[H]  ----------------------------<  97  4.6   |  25  |  1.11    Ca    9.1      15 Apr 2025 13:23  Phos  3.3     04-15  Mg     2.4     04-15    TPro  6.4  /  Alb  3.7  /  TBili  0.3  /  DBili  x   /  AST  38  /  ALT  21  /  AlkPhos  135[H]  04-14    PT/INR - ( 15 Apr 2025 06:15 )   PT: 13.2 sec;   INR: 1.16 ratio         PTT - ( 15 Apr 2025 06:15 )  PTT:23.9 sec  Urinalysis Basic - ( 15 Apr 2025 13:23 )    Color: x / Appearance: x / SG: x / pH: x  Gluc: 97 mg/dL / Ketone: x  / Bili: x / Urobili: x   Blood: x / Protein: x / Nitrite: x   Leuk Esterase: x / RBC: x / WBC x   Sq Epi: x / Non Sq Epi: x / Bacteria: x        List Injuries Identified to Date:    List Operative and Interventional Radiological Procedures:     Consults (Date):  [  ] Neurosurgery   [  ] Orthopedics  [  ] Plastics  [  ] Urology  [  ] PM&R  [  ] Social Work      RADIOLOGICAL FINDINGS REVIEW:  CXR:  ACC: [id number] EXAM: XR CHEST PA LAT 2V ORDERED BY: AMBROSE SULLIVAN  PROCEDURE DATE: 04/14/2025  INTERPRETATION: EXAMINATION: XR CHEST PA AND LATERAL  CLINICAL INDICATION: recent trauma now hypoxic 2 broken ribs  TECHNIQUE: 2 views; Frontal and lateral views of the chest were obtained.  COMPARISON: None.  FINDINGS:    The heart is normal in size.  Large left effusion with mild rightward mediastinal shift.  There is no pneumothorax.  Left sided rib deformities.    IMPRESSION:  Large left effusion which may represent hemothorax in the setting of trauma.  Left sided rib deformities.  Please correlate these findings with CT chest performed on the same day.        Chest, ABD/Pelvis CT:  ACC: [id number] EXAM: CT ABDOMEN AND PELVIS IC ORDERED BY: AMBROSE SULLIVAN  ACC: [id number] EXAM: CT ANGIO CHEST PULM ART WAWIC ORDERED BY: AMBROSE SULLIVAN  PROCEDURE DATE: 04/14/2025  INTERPRETATION: Clinical information: Evaluate for pulmonary embolus.  CT angiogram of the chest was obtained following administration of intravenous contrast. In addition, CT scan of the abdomen and pelvis performed. Approximately 90 cc of Omnipaque 350 was administered and 10 cc was discarded. Coronal, sagittal and MIP images were submitted for review.    No hilar or mediastinal adenopathy is noted.    Heart is normal in size. No pericardial effusion is noted. Pulmonary arteries are normal in caliber. No filling defects are noted.    No endobronchial lesions are noted. Patchy groundglass opacities are noted within the right upper and right lower lobes. Compressive atelectasis is noted involving most of the left upper and left lower lobes. Large left pleural effusion is noted. Areas of high attenuation are noted within the left pleural effusion. Resultant shift of the mediastinum towards the right is noted. No pneumothorax is noted.    Liver is normal in size and contains small low-attenuation lesions which are too small to be adequately characterized on this exam. Spleen, pancreas and both adrenal glands are unremarkable. Stones are noted within the gallbladder.    Both kidneys enhance with contrast. Patchy areas of low attenuation noted within both kidneys are indeterminate based on this exam.    No retroperitoneal adenopathy is noted.    Stool is noted within the large bowel. Diverticula are noted within the colon. Right internal hernia containing loop of bowel is noted.    Examination of the pelvis demonstrate patient to be status post hysterectomy.    Fractures of the left seventh through the left 12th ribs are noted.    IMPRESSION: No pulmonary embolus is noted.    Fractures of the left seventh through the left 12th ribs.    Findings suggestive of a large left hemothorax. Mild shift of the mediastinum towards the right is noted.          Other:    Physical Exam:  General: NAD, resting comfortably  HEENT: NC/AT, EOMI, normal hearing, no oral lesions, no LAD, neck supple  Pulmonary: normal resp effort, CTA-B  Cardiovascular: NSR, no murmurs  Abdominal: soft, ND/NT, no organomegaly  Extremities: WWP, normal strength, no clubbing/cyanosis/edema  Neuro: A/O x 3, CNs II-XII grossly intact, normal sensation, no focal deficits  Pulses: palpable distal pulses      Interpretation of Findings: Tertiary Trauma Survey (TTS)    Date of TTS: 04/15/25               Time:   Admit Date:                              Trauma Activation:  Admit GCS: E-     V-     M-     HPI:  89F PMHx HTN, HLD, temporal arteritis, breast cancer in remission who had a mechanical fall on 03/28/25. After the fall the patient went to her PCP who took a CXR and diagnosed her with rib fractures and recommended rest and Tylenol. Patient presenting to the ED with shortness of breath.  On EMS arrival she was hypoxic to mid 80s, on 2L nasal cannula she improved to mid 90s. As part of the workup the patient got a CT Chest which showed Fractures of the left seventh through the left 12th ribs and Findings suggestive of a large left hemothorax. Mild shift of the mediastinum towards the right is noted. Labs significant for WBC 10. Na 128.     (14 Apr 2025 16:17)      PAST MEDICAL & SURGICAL HISTORY:    [  ] No significant past history as reviewed with the patient and family    FAMILY HISTORY:    [  ] Family history not pertinent as reviewed with the patient and family    SOCIAL HISTORY:    Medications (inpatient): acetaminophen   IVPB .. 1000 milliGRAM(s) IV Intermittent every 6 hours  atorvastatin 20 milliGRAM(s) Oral at bedtime  chlorhexidine 2% Cloths 1 Application(s) Topical <User Schedule>  enoxaparin Injectable 30 milliGRAM(s) SubCutaneous every 24 hours  hydrochlorothiazide 25 milliGRAM(s) Oral daily  influenza  Vaccine (HIGH DOSE) 0.5 milliLiter(s) IntraMuscular once  letrozole 2.5 milliGRAM(s) Oral daily  levothyroxine 100 MICROGram(s) Oral <User Schedule>  levothyroxine 50 MICROGram(s) Oral <User Schedule>  lidocaine   4% Patch 1 Patch Transdermal every 24 hours  metoprolol tartrate 25 milliGRAM(s) Oral every 12 hours  multivitamin 1 Tablet(s) Oral daily  pantoprazole    Tablet 40 milliGRAM(s) Oral before breakfast  polyethylene glycol 3350 17 Gram(s) Oral every 24 hours  senna 2 Tablet(s) Oral at bedtime  sodium chloride 1 Gram(s) Oral two times a day    Medications (PRN):fluticasone propionate 50 MICROgram(s)/spray Nasal Spray 1 Spray(s) Both Nostrils two times a day PRN    Allergies: No Known Allergies  (Intolerances: )    Vital Signs Last 24 Hrs  T(C): 36.7 (15 Apr 2025 07:00), Max: 36.7 (14 Apr 2025 17:30)  T(F): 98.1 (15 Apr 2025 07:00), Max: 98.1 (15 Apr 2025 07:00)  HR: 61 (15 Apr 2025 09:00) (61 - 78)  BP: 123/60 (15 Apr 2025 09:00) (104/58 - 168/80)  BP(mean): 87 (15 Apr 2025 09:00) (76 - 95)  RR: 38 (15 Apr 2025 09:00) (17 - 38)  SpO2: 99% (15 Apr 2025 09:00) (95% - 100%)    Parameters below as of 15 Apr 2025 07:00  Patient On (Oxygen Delivery Method): nasal cannula  O2 Flow (L/min): 3    Drug Dosing Weight  Height (cm): 144.8 (14 Apr 2025 19:46)  Weight (kg): 50.5 (14 Apr 2025 19:46)  BMI (kg/m2): 24.1 (14 Apr 2025 19:46)  BSA (m2): 1.4 (14 Apr 2025 19:46)                          8.8    8.51  )-----------( 305      ( 15 Apr 2025 06:15 )             26.1     04-15    124[L]  |  87[L]  |  33[H]  ----------------------------<  97  4.6   |  25  |  1.11    Ca    9.1      15 Apr 2025 13:23  Phos  3.3     04-15  Mg     2.4     04-15    TPro  6.4  /  Alb  3.7  /  TBili  0.3  /  DBili  x   /  AST  38  /  ALT  21  /  AlkPhos  135[H]  04-14    PT/INR - ( 15 Apr 2025 06:15 )   PT: 13.2 sec;   INR: 1.16 ratio         PTT - ( 15 Apr 2025 06:15 )  PTT:23.9 sec  Urinalysis Basic - ( 15 Apr 2025 13:23 )    Color: x / Appearance: x / SG: x / pH: x  Gluc: 97 mg/dL / Ketone: x  / Bili: x / Urobili: x   Blood: x / Protein: x / Nitrite: x   Leuk Esterase: x / RBC: x / WBC x   Sq Epi: x / Non Sq Epi: x / Bacteria: x        List Injuries Identified to Date:    List Operative and Interventional Radiological Procedures:     Consults (Date):  [  ] Neurosurgery   [  ] Orthopedics  [  ] Plastics  [  ] Urology  [  ] PM&R  [  ] Social Work      RADIOLOGICAL FINDINGS REVIEW:  CXR:  ACC: [id number] EXAM: XR CHEST PA LAT 2V ORDERED BY: AMBROSE SULLIVAN  PROCEDURE DATE: 04/14/2025  INTERPRETATION: EXAMINATION: XR CHEST PA AND LATERAL  CLINICAL INDICATION: recent trauma now hypoxic 2 broken ribs  TECHNIQUE: 2 views; Frontal and lateral views of the chest were obtained.  COMPARISON: None.  FINDINGS:    The heart is normal in size.  Large left effusion with mild rightward mediastinal shift.  There is no pneumothorax.  Left sided rib deformities.    IMPRESSION:  Large left effusion which may represent hemothorax in the setting of trauma.  Left sided rib deformities.  Please correlate these findings with CT chest performed on the same day.        Chest, ABD/Pelvis CT:  ACC: [id number] EXAM: CT ABDOMEN AND PELVIS IC ORDERED BY: AMBROSE SULLIVAN  ACC: [id number] EXAM: CT ANGIO CHEST PULM ART WAWIC ORDERED BY: AMBROSE SULLIVAN  PROCEDURE DATE: 04/14/2025    INTERPRETATION:   Clinical information: Evaluate for pulmonary embolus.  CT angiogram of the chest was obtained following administration of intravenous contrast. In addition, CT scan of the abdomen and pelvis performed. Approximately 90 cc of Omnipaque 350 was administered and 10 cc was discarded. Coronal, sagittal and MIP images were submitted for review.  No hilar or mediastinal adenopathy is noted.  Heart is normal in size. No pericardial effusion is noted. Pulmonary arteries are normal in caliber. No filling defects are noted.  No endobronchial lesions are noted. Patchy groundglass opacities are noted within the right upper and right lower lobes. Compressive atelectasis is noted involving most of the left upper and left lower lobes. Large left pleural effusion is noted. Areas of high attenuation are noted within the left pleural effusion. Resultant shift of the mediastinum towards the right is noted. No pneumothorax is noted.  Liver is normal in size and contains small low-attenuation lesions which are too small to be adequately characterized on this exam. Spleen, pancreas and both adrenal glands are unremarkable. Stones are noted within the gallbladder.  Both kidneys enhance with contrast. Patchy areas of low attenuation noted within both kidneys are indeterminate based on this exam.  No retroperitoneal adenopathy is noted.  Stool is noted within the large bowel. Diverticula are noted within the colon. Right internal hernia containing loop of bowel is noted.  Examination of the pelvis demonstrate patient to be status post hysterectomy.  Fractures of the left seventh through the left 12th ribs are noted.      IMPRESSION:   No pulmonary embolus is noted.  Fractures of the left seventh through the left 12th ribs.  Findings suggestive of a large left hemothorax. Mild shift of the mediastinum towards the right is noted.          Other:    Physical Exam:  General: NAD, resting comfortably  HEENT: NC/AT, EOMI, normal hearing, no oral lesions, no LAD, neck supple  Pulmonary: normal resp effort on NC, pain with deep inspiration  Abdominal: soft, ND/NT, no organomegaly  Back: no ttp along c/t/l spine  Extremities: WWP, normal strength, no clubbing/cyanosis/edema  Neuro: A/O x 3, CNs II-XII grossly intact, normal sensation, no focal deficits  Pulses: palpable distal pulses      Interpretation of Findings:    89F PMHx HTN, HLD, temporal arteritis, breast cancer in remission who had a mechanical fall on 03/28/25, now presenting with SOB found to have fractures of the left 7th-12th rubs and findings suggestive of a large left hemothorax with mild shift of the mediastinum.     Plan:   - Monitor pigtail OP  - Patient would need thoracic surgery consult if hematoma remains s/p pigtail   - Encourage IS  - Pain control   - PT  - Rest of care per SICU    ACS  [id number]

## 2025-04-15 NOTE — DIETITIAN INITIAL EVALUATION ADULT - NUTRITIONGOAL OUTCOME1
Pt here for evaluation of dog bite to left upper arm. States she went home for lunch and her sisters dog ([employer]) got pinned behind door as she opened it. States dog nipped her left upper arm. 2 about 1 inch long each, abrasions noted on left upper arm.  DOI 08/20. Last Tdap was 06/2016.   Visit Vitals  /68   Pulse 79   Temp 98.6 °F (37 °C) (Oral)   Resp 18   Wt 68 kg   LMP 02/20/2019   SpO2 98%   BMI 24.95 kg/m²       Wound Care:  The wound is cleansed, debrided of foreign material as much as possible with sterile water, Shur-Clens and Prima-Derm. Patient tolerated well.  Tetanus status reviewed.    Pt to receive >80% of estimated needs

## 2025-04-15 NOTE — PROGRESS NOTE ADULT - ATTENDING COMMENTS
89 yo f, s/p fall 2 weeks ago, injuries include L 7-12 rib fx, hemothorax, s/p L pigtail.  N AOx4, multimodal pain management.  P NC 3L, pigtail 2.2L serosanguineous. CXR no acute changes.  C Continue metoprolol. Lactate cleared.  G Regular.   R . Cr 1.08. Na 123. Resume home meds.  H Hgb 8.8. Trend CBC.  I Off abx.  E ISS.  DVT lovenox/SCDs. 89 yo f, s/p fall 2 weeks ago, injuries include L 7-12 rib fx, hemothorax, s/p L pigtail.  N AOx4, multimodal pain management.  P NC 3L, pigtail 2.2L serosanguineous. CXR no acute changes.  C Continue metoprolol. Lactate cleared.  G Regular.   R . Cr 1.08. Na 123, NaCl tabs. Resume home meds.  H Hgb 8.8. Trend CBC.  I Off abx.  E ISS.  DVT lovenox/SCDs.

## 2025-04-16 ENCOUNTER — TRANSCRIPTION ENCOUNTER (OUTPATIENT)
Age: 89
End: 2025-04-16

## 2025-04-16 LAB
ANION GAP SERPL CALC-SCNC: 11 MMOL/L — SIGNIFICANT CHANGE UP (ref 5–17)
ANION GAP SERPL CALC-SCNC: 14 MMOL/L — SIGNIFICANT CHANGE UP (ref 5–17)
APTT BLD: 27.8 SEC — SIGNIFICANT CHANGE UP (ref 24.5–35.6)
BUN SERPL-MCNC: 29 MG/DL — HIGH (ref 7–23)
BUN SERPL-MCNC: 31 MG/DL — HIGH (ref 7–23)
CALCIUM SERPL-MCNC: 8.6 MG/DL — SIGNIFICANT CHANGE UP (ref 8.4–10.5)
CALCIUM SERPL-MCNC: 8.9 MG/DL — SIGNIFICANT CHANGE UP (ref 8.4–10.5)
CHLORIDE SERPL-SCNC: 93 MMOL/L — LOW (ref 96–108)
CHLORIDE SERPL-SCNC: 95 MMOL/L — LOW (ref 96–108)
CO2 SERPL-SCNC: 25 MMOL/L — SIGNIFICANT CHANGE UP (ref 22–31)
CO2 SERPL-SCNC: 25 MMOL/L — SIGNIFICANT CHANGE UP (ref 22–31)
CREAT SERPL-MCNC: 0.97 MG/DL — SIGNIFICANT CHANGE UP (ref 0.5–1.3)
CREAT SERPL-MCNC: 1 MG/DL — SIGNIFICANT CHANGE UP (ref 0.5–1.3)
EGFR: 52 ML/MIN/1.73M2 — LOW
EGFR: 52 ML/MIN/1.73M2 — LOW
EGFR: 54 ML/MIN/1.73M2 — LOW
EGFR: 54 ML/MIN/1.73M2 — LOW
GAS PNL BLDV: SIGNIFICANT CHANGE UP
GLUCOSE SERPL-MCNC: 142 MG/DL — HIGH (ref 70–99)
GLUCOSE SERPL-MCNC: 87 MG/DL — SIGNIFICANT CHANGE UP (ref 70–99)
HCT VFR BLD CALC: 25.9 % — LOW (ref 34.5–45)
HGB BLD-MCNC: 8.6 G/DL — LOW (ref 11.5–15.5)
INR BLD: 1.22 RATIO — HIGH (ref 0.85–1.16)
MAGNESIUM SERPL-MCNC: 2 MG/DL — SIGNIFICANT CHANGE UP (ref 1.6–2.6)
MAGNESIUM SERPL-MCNC: 2.2 MG/DL — SIGNIFICANT CHANGE UP (ref 1.6–2.6)
MCHC RBC-ENTMCNC: 29.4 PG — SIGNIFICANT CHANGE UP (ref 27–34)
MCHC RBC-ENTMCNC: 33.2 G/DL — SIGNIFICANT CHANGE UP (ref 32–36)
MCV RBC AUTO: 88.4 FL — SIGNIFICANT CHANGE UP (ref 80–100)
NRBC BLD AUTO-RTO: 0 /100 WBCS — SIGNIFICANT CHANGE UP (ref 0–0)
PHOSPHATE SERPL-MCNC: 2.7 MG/DL — SIGNIFICANT CHANGE UP (ref 2.5–4.5)
PHOSPHATE SERPL-MCNC: 3.1 MG/DL — SIGNIFICANT CHANGE UP (ref 2.5–4.5)
PLATELET # BLD AUTO: 318 K/UL — SIGNIFICANT CHANGE UP (ref 150–400)
POTASSIUM SERPL-MCNC: 4.1 MMOL/L — SIGNIFICANT CHANGE UP (ref 3.5–5.3)
POTASSIUM SERPL-MCNC: 4.3 MMOL/L — SIGNIFICANT CHANGE UP (ref 3.5–5.3)
POTASSIUM SERPL-SCNC: 4.1 MMOL/L — SIGNIFICANT CHANGE UP (ref 3.5–5.3)
POTASSIUM SERPL-SCNC: 4.3 MMOL/L — SIGNIFICANT CHANGE UP (ref 3.5–5.3)
PROTHROM AB SERPL-ACNC: 14 SEC — HIGH (ref 9.9–13.4)
RBC # BLD: 2.93 M/UL — LOW (ref 3.8–5.2)
RBC # FLD: 15 % — HIGH (ref 10.3–14.5)
SODIUM SERPL-SCNC: 131 MMOL/L — LOW (ref 135–145)
SODIUM SERPL-SCNC: 132 MMOL/L — LOW (ref 135–145)
WBC # BLD: 9.04 K/UL — SIGNIFICANT CHANGE UP (ref 3.8–10.5)
WBC # FLD AUTO: 9.04 K/UL — SIGNIFICANT CHANGE UP (ref 3.8–10.5)

## 2025-04-16 PROCEDURE — 99233 SBSQ HOSP IP/OBS HIGH 50: CPT

## 2025-04-16 PROCEDURE — 71045 X-RAY EXAM CHEST 1 VIEW: CPT | Mod: 26

## 2025-04-16 RX ORDER — ACETAMINOPHEN 500 MG/5ML
1000 LIQUID (ML) ORAL EVERY 6 HOURS
Refills: 0 | Status: DISCONTINUED | OUTPATIENT
Start: 2025-04-16 | End: 2025-04-17

## 2025-04-16 RX ADMIN — ENOXAPARIN SODIUM 30 MILLIGRAM(S): 100 INJECTION SUBCUTANEOUS at 11:55

## 2025-04-16 RX ADMIN — METOPROLOL SUCCINATE 25 MILLIGRAM(S): 50 TABLET, EXTENDED RELEASE ORAL at 18:07

## 2025-04-16 RX ADMIN — Medication 1 APPLICATION(S): at 05:40

## 2025-04-16 RX ADMIN — Medication 1 TABLET(S): at 11:55

## 2025-04-16 RX ADMIN — LIDOCAINE HYDROCHLORIDE 1 PATCH: 20 JELLY TOPICAL at 22:46

## 2025-04-16 RX ADMIN — METOPROLOL SUCCINATE 25 MILLIGRAM(S): 50 TABLET, EXTENDED RELEASE ORAL at 05:39

## 2025-04-16 RX ADMIN — LIDOCAINE HYDROCHLORIDE 1 PATCH: 20 JELLY TOPICAL at 07:00

## 2025-04-16 RX ADMIN — Medication 40 MILLIGRAM(S): at 05:40

## 2025-04-16 RX ADMIN — Medication 1 GRAM(S): at 05:39

## 2025-04-16 RX ADMIN — POLYETHYLENE GLYCOL 3350 17 GRAM(S): 17 POWDER, FOR SOLUTION ORAL at 18:07

## 2025-04-16 RX ADMIN — Medication 63.75 MILLIMOLE(S): at 22:45

## 2025-04-16 RX ADMIN — Medication 100 MICROGRAM(S): at 05:39

## 2025-04-16 RX ADMIN — ATORVASTATIN CALCIUM 20 MILLIGRAM(S): 80 TABLET, FILM COATED ORAL at 22:46

## 2025-04-16 RX ADMIN — LETROZOLE 2.5 MILLIGRAM(S): 2.5 TABLET, FILM COATED ORAL at 11:55

## 2025-04-16 RX ADMIN — LIDOCAINE HYDROCHLORIDE 1 PATCH: 20 JELLY TOPICAL at 11:00

## 2025-04-16 RX ADMIN — Medication 2 TABLET(S): at 22:46

## 2025-04-16 NOTE — DISCHARGE NOTE PROVIDER - NSDCFUSCHEDAPPT_GEN_ALL_CORE_FT
[facility]  OPHTHALM [id number] [facility]  Scheduled Appointment: 05/14/2025    [facility]  OPHTHALM [address] [facility]  Scheduled Appointment: 05/14/2025    [place]  [facility]  PODIATRY [address]  Scheduled Appointment: 06/11/2025

## 2025-04-16 NOTE — PROGRESS NOTE ADULT - PROBLEM SELECTOR PLAN 2
Likely due to injuries as above  Continue supplemental oxygen as tolerated. Wean as able. Goal 94-98%.

## 2025-04-16 NOTE — PROGRESS NOTE ADULT - SUBJECTIVE AND OBJECTIVE BOX
TRAUMA SURGERY [facility] MEDICINE PROGRESS NOTE  [facility] Division of Hospital Medicine  Joanie Diane DO  MS Teams PREFERRED    Patient is a 89y old  Female who presents with a chief complaint of Fall (16 Apr 2025 09:49)      SUBJECTIVE / OVERNIGHT EVENTS: No acute events overnight. Patient sitting in recliner - appears comfortable. She reports feeling better - pain controlled and also appetite improved.     CAPILLARY BLOOD GLUCOSE        I&O's Summary    15 Apr 2025 07:01  -  16 Apr 2025 07:00  --------------------------------------------------------  IN: 1540 mL / OUT: 995 mL / NET: 545 mL    16 Apr 2025 07:01  -  16 Apr 2025 11:07  --------------------------------------------------------  IN: 250 mL / OUT: 0 mL / NET: 250 mL        CONSTITUTIONAL: NAD, well-developed, well-groomed  EYES: Conjunctiva and sclera clear  ENMT: Moist oral mucosa, no pharyngeal injection or exudates   NECK: Supple, midline  RESPIRATORY: Normal respiratory effort; lungs are clear to auscultation bilaterally on anterior auscultation; on NC  CARDIOVASCULAR: Regular rate and rhythm, normal S1 and S2.   ABDOMEN: Nontender to palpation, no rebound/guarding  PSYCH: A+O to person, place, and time; affect appropriate      LABS:                        8.6    9.04  )-----------( 318      ( 16 Apr 2025 05:43 )             25.9     04-16    131[L]  |  95[L]  |  31[H]  ----------------------------<  87  4.1   |  25  |  0.97    Ca    8.6      16 Apr 2025 05:44  Phos  3.1     04-16  Mg     2.2     04-16    TPro  6.4  /  Alb  3.7  /  TBili  0.3  /  DBili  x   /  AST  38  /  ALT  21  /  AlkPhos  135[H]  04-14    PT/INR - ( 16 Apr 2025 05:43 )   PT: 14.0 sec;   INR: 1.22 ratio         PTT - ( 16 Apr 2025 05:43 )  PTT:27.8 sec      Urinalysis Basic - ( 16 Apr 2025 05:44 )    Color: x / Appearance: x / SG: x / pH: x  Gluc: 87 mg/dL / Ketone: x  / Bili: x / Urobili: x   Blood: x / Protein: x / Nitrite: x   Leuk Esterase: x / RBC: x / WBC x   Sq Epi: x / Non Sq Epi: x / Bacteria: x          MEDICATIONS  (STANDING):  atorvastatin 20 milliGRAM(s) Oral at bedtime  chlorhexidine 2% Cloths 1 Application(s) Topical <User Schedule>  enoxaparin Injectable 30 milliGRAM(s) SubCutaneous every 24 hours  influenza  Vaccine (HIGH DOSE) 0.5 milliLiter(s) IntraMuscular once  letrozole 2.5 milliGRAM(s) Oral daily  levothyroxine 100 MICROGram(s) Oral <User Schedule>  levothyroxine 50 MICROGram(s) Oral <User Schedule>  lidocaine   4% Patch 1 Patch Transdermal every 24 hours  metoprolol tartrate 25 milliGRAM(s) Oral every 12 hours  multivitamin 1 Tablet(s) Oral daily  pantoprazole    Tablet 40 milliGRAM(s) Oral before breakfast  polyethylene glycol 3350 17 Gram(s) Oral every 24 hours  senna 2 Tablet(s) Oral at bedtime    MEDICATIONS  (PRN):  acetaminophen     Tablet .. 1000 milliGRAM(s) Oral every 6 hours PRN Mild Pain (1 - 3), Moderate Pain (4 - 6)  fluticasone propionate 50 MICROgram(s)/spray Nasal Spray 1 Spray(s) Both Nostrils two times a day PRN Congestion

## 2025-04-16 NOTE — ADVANCED PRACTICE NURSE CONSULT - ASSESSMENT
Patient encountered on 5SIC. When wound care RN arrived on unit, patient was found sitting in a reclining chair at bedside. Ms Cole was alert and oriented and gave consent to skin consult. She is able to stand with staff assistance x 1. Once standing, the wound care RN was able to visualize an area of persistent nonblanchable dark red erythema over B/L buttocks/sacral skin, area measures approximately 5cm x 5cm x 0cm - presentation is consistent with a deep tissue injury present on admission. B/L ischium with hyperpigmentation measuring approximately 4cm x 4cm x 0cm, bilaterally - initial phases of a deep tissue injury cannot be ruled out at this time. B/L heel hyperpigmentation measures approximately 2cm x 2cm x 0cm - initial phases of a deep tissue injury cannot be ruled out at this time. B/L plantar foot calluses noted. Left anterior shin tegaderm dressing in place, saturated, yellow drainage noted on dressing. Once removed, foul odor detected. Patient reports dermatologic procedure at site.     Wound measures 5.5cm x 4.5cm x 0.1cm, granulation tissue present, white macerated wound edges.    left 2nd toe wound       The wound care RN was able to visualize an area of persistent nonblanchable deep red, maroon discoloration with purple hues.  Once consult was complete, patient and family were educated regarding the need for routine turning and positioning to prevent pressure injuries and patient was placed in a left side-lying position utilizing pillow positioner assistive devices.    Patient encountered on 5SIC. When wound care RN arrived on unit, patient was found sitting in a reclining chair at bedside. Ms Cole was alert and oriented and gave consent to skin consult. She is able to stand with staff assistance x 1. Once standing, the wound care RN was able to visualize an area of persistent nonblanchable dark red erythema over B/L buttocks/sacral skin, area measures approximately 5cm x 5cm x 0cm - presentation is consistent with a deep tissue injury present on admission. B/L ischium with hyperpigmentation measuring approximately 4cm x 4cm x 0cm, bilaterally - initial phases of a deep tissue injury cannot be ruled out at this time. B/L heel hyperpigmentation measures approximately 2cm x 2cm x 0cm - initial phases of a deep tissue injury cannot be ruled out at this time. B/L plantar foot calluses noted. Left anterior shin tegaderm dressing in place, saturated, yellow drainage noted on dressing. Once removed, foul odor detected. Patient reports dermatologic procedure at site. Wound measures 5.5cm x 4.5cm x 0.1cm, granulation tissue present, white macerated wound edges noted. Site cleansed with normal saline, cavilon applied to periphery of wound bed, adaptic silicone contact layer applied to base and covered with allevyn foam dressing- may consider dermatology for wound care recommendations, discussed with RANDY Crowe. Left 2nd toe with dressing in place, patient refused WOC to remove dressing-  podiatry recommended for treatment recommendations. Once consult was complete, patient and family were educated regarding the need for routine turning and positioning to prevent pressure injuries and patient was placed in a left side-lying position utilizing pillow positioner assistive devices.

## 2025-04-16 NOTE — CHART NOTE - NSCHARTNOTEFT_GEN_A_CORE
SICU Transfer Note    Transfer from: SICU  Transfer to: 7T      SICU COURSE:  89F PMHx HTN, HLD, temporal arteritis, breast cancer in remission who had a mechanical fall on 03/28/25, now presenting with SOB found to have fractures of the left 7th-12th rubs and findings suggestive of a large left hemothorax with mild shift of the mediastinum s/p pigtail placement in ED w/ 2L of mixed old blood, removed on 4/15. SICU for HDM. H/H was stable and pt satting well on 2L NC was deemed stable for downgrade on 4/16. Seen on the floor denies CP/SOB. Encouraged incentive spirometer use.    For Follow-Up:  - Left shin wound seen by wound care   - AM CXR  - f/u NA levels (salt tabs d/c'ed today)    Vital Signs Last 24 Hrs  T(C): 36.7 (16 Apr 2025 15:00), Max: 36.8 (16 Apr 2025 05:00)  T(F): 98.1 (16 Apr 2025 15:00), Max: 98.3 (16 Apr 2025 05:00)  HR: 88 (16 Apr 2025 19:00) (49 - 101)  BP: 146/70 (16 Apr 2025 19:00) (100/57 - 172/81)  BP(mean): 100 (16 Apr 2025 19:00) (73 - 116)  RR: 40 (16 Apr 2025 19:00) (20 - 55)  SpO2: 98% (16 Apr 2025 19:00) (81% - 100%)    Parameters below as of 16 Apr 2025 15:00  Patient On (Oxygen Delivery Method): nasal cannula  O2 Flow (L/min): 2    I&O's Summary    15 Apr 2025 07:01  -  16 Apr 2025 07:00  --------------------------------------------------------  IN: 1540 mL / OUT: 995 mL / NET: 545 mL    16 Apr 2025 07:01  -  16 Apr 2025 20:57  --------------------------------------------------------  IN: 800 mL / OUT: 500 mL / NET: 300 mL          MEDICATIONS  (STANDING):  atorvastatin 20 milliGRAM(s) Oral at bedtime  chlorhexidine 2% Cloths 1 Application(s) Topical <User Schedule>  enoxaparin Injectable 30 milliGRAM(s) SubCutaneous every 24 hours  influenza  Vaccine (HIGH DOSE) 0.5 milliLiter(s) IntraMuscular once  letrozole 2.5 milliGRAM(s) Oral daily  levothyroxine 100 MICROGram(s) Oral <User Schedule>  levothyroxine 50 MICROGram(s) Oral <User Schedule>  lidocaine   4% Patch 1 Patch Transdermal every 24 hours  metoprolol tartrate 25 milliGRAM(s) Oral every 12 hours  multivitamin 1 Tablet(s) Oral daily  pantoprazole    Tablet 40 milliGRAM(s) Oral before breakfast  polyethylene glycol 3350 17 Gram(s) Oral every 24 hours  senna 2 Tablet(s) Oral at bedtime    MEDICATIONS  (PRN):  acetaminophen     Tablet .. 1000 milliGRAM(s) Oral every 6 hours PRN Mild Pain (1 - 3), Moderate Pain (4 - 6)  fluticasone propionate 50 MICROgram(s)/spray Nasal Spray 1 Spray(s) Both Nostrils two times a day PRN Congestion        LABS                                            8.6                   Neurophils% (auto):   x      (04-16 @ 05:43):    9.04 )-----------(318          Lymphocytes% (auto):  x                                             25.9                   Eosinphils% (auto):   x        Manual%: Neutrophils x    ; Lymphocytes x    ; Eosinophils x    ; Bands%: x    ; Blasts x                                    132    |  93     |  29                  Calcium: 8.9   / iCa: x      (04-16 @ 18:34)    ----------------------------<  142       Magnesium: 2.0                              4.3     |  25     |  1.00             Phosphorous: 2.7        ( 04-16 @ 05:43 )   PT: 14.0 sec;   INR: 1.22 ratio  aPTT: 27.8 sec

## 2025-04-16 NOTE — OCCUPATIONAL THERAPY INITIAL EVALUATION ADULT - PERTINENT HX OF CURRENT PROBLEM, REHAB EVAL
Pt is a 89F admitted to [facility] on 4/14/25 PMHx HTN, HLD, temporal arteritis, breast cancer in remission who had a mechanical fall on 03/28/25. After the fall the pt went to her PCP who took a CXR and diagnosised her with rib fractures and recommended rest and Tylenol. Pt presenting to the ED with shortness of breath.  On EMS arrival she was hypoxic to mid 80s, on 2L nasal cannula she improved to mid 90s. As part of the workup the pt got a CT Chest which showed Fractures of the left seventh through the left 12th ribs and Findings suggestive of a large left hemothorax. Mild shift of the mediastinum towards the right is noted. Labs significant for WBC 10. Na 128. Pt is admitted to SICU for hemodynamic monitoring, respiratory observation, and management of hemothorax. +Left 7-12 rib fractures, +Left hemothorax with midline mediastinal shift. CTA chest: No pulmonary embolus is noted. Fractures of the left seventh through the left 12th ribs. Findings suggestive of a large left hemothorax. Mild shift of the mediastinum towards the right is noted. CXR: Small left pleural effusion with underlying patchy opacities. Left-sided pigtail catheter. + S/p pigtail with 2L of sanguinous (mixed new and old) output, w/ immediate improvement in resp status, RVP negative (4/14)

## 2025-04-16 NOTE — PROGRESS NOTE ADULT - PROBLEM SELECTOR PLAN 4
At home, takes metoprolol and HCTZ   Both may increase fall risk for older adults - if on metoprolol and HCTZ for primary HTN only, would recommend holding and starting alternative medication - amlodipine 5mg daily.   Monitor BP.

## 2025-04-16 NOTE — CONSULT NOTE ADULT - ASSESSMENT
89 f with    Ribs fractures  - Incentive spirometry  - pain control    Hemothorax  - s/p CT    Respiratory failure  - supplement O2    Hyponatremia  - fluid restrict  - follow    HTN  - control    Hyperlipedemia  - stable    Hypothyroid  - follow TSH  - supplement    Breast cancer  - Letrozole    Anemia  - iron studies, B12    DVT prophylaxis    Further action as per clinical course     Marty Hollis MD phone [phone] 
89F presents with left foot 2nd digit corn   - Patient seen and evaluated  - Afebrile, no leukocytosis   - No open lesions or acute signs of infection of bilateral foot. Left foot 2nd digit corn, no acute signs of infection.   - Ordered z flow boots, strict offloading of heels at all time   - No acute pod intervention, local wound care only   - Follow up information in discharge note provider   - Discussed with attending 
ASSESSMENT: 89-year-old female with past medical history of HTN, HLD, temporal arteritis, breast cancer in remission, suffered mechanical fall on 3/28/2025.  After fall presented to PCP who diagnosed her with rib fractures, recommended pain management and rest.  Patient now had acute worsening of shortness of breath and increased work of breathing, hypoxic to mid 80s, presented to ED with CT chest showing evidence of left 7 through 12 rib fractures and large left hemothorax with mid shifted mediastinum.  Patient had pigtail chest tube placed with 2 L of mixed old blood removed.  Patient with marked improvement of respiratory effort after pigtail placement.  Patient is admitted to SICU for hemodynamic monitoring, respiratory observation, and management of hemothorax.    Injuries:  Left 7-12 rib fractures  Left hemothorax with midline mediastinal shift    PLAN:    NEURO:  - Alert and oriented x4, mentating well  - Pain: tylenol    RESPIRATORY:   - CT w/ 7-12 rib fx and large left hemothorax with midline mediastinal shift  - S/p pigtail with 2L of sanguinous (mixed new and old) output, w/ immediate improvement in resp status  - Continue to monitor CT output  - Supplemental O2 PRN for O2 sat > 92%  - Encourage IS  - Daily XR    CARDIOVASCULAR:  - Hemodynamically stable  - C/w home metoprolol, statin  - Trend lactate  - Trop 39 on arrival, trend to peak    GI/NUTRITION:  - Diet: regular  - Continue home pantoprazole    GENITOURINARY/RENAL:  - Creatinine normal on admission  - Monitor urine output  - Replete lytes as indicated K > 4, Phos > 3, Mg > 2      HEMATOLOGIC:  - SCD for DVT prophylaxis  - Hold chemical prophylaxis in setting of hemothorax  - Trend q6 CBC for H/H stability      INFECTIOUS DISEASE:  - Continue home letrozole (immunotherapy)  - RVP negative (4/14)  - No active evidence of infection, monitor off abx  - Trend WBC and fever curve      ENDOCRINE:  - Trend sugars  - Home synthroid    Dispo: SICU  
89F PMHx HTN, HLD, temporal arteritis, breast cancer in remission who had a mechanical fall on 03/28/25. Imaging showed fractures of the left seventh through the left 12th ribs and findings suggestive of a large left hemothorax. Mild shift of the mediastinum towards the right is noted. s/p pigtail

## 2025-04-16 NOTE — PROGRESS NOTE ADULT - NSPROGADDITIONALINFOA_GEN_ALL_CORE
Additional Information: Time-based billing (NON-critical care).     51 minutes spent on total encounter. The necessity of the time spent during the encounter on this date of service was due to:     - Reviewing, and interpreting labs, testing, and imaging.  - Independently obtaining a review of systems and performing a physical exam  - Reviewing prior records and where necessary, outpatient records.  - Counselling and educating patient regarding interpretation of aforementioned items and plan of care.    Discussed with SICU

## 2025-04-16 NOTE — PROGRESS NOTE ADULT - SUBJECTIVE AND OBJECTIVE BOX
SUBJECTIVE  The patient was seen and examined. No acute events overnight. Pain controlled, afebrile w/ stable vitals. R chest pigtail removed yesterday, CXR stable post-pull. Pulls <500 on IS.    OBJECTIVE  ___________________________________________________  VITAL SIGNS / I&O's   Vital Signs Last 24 Hrs  T(C): 36.8 (16 Apr 2025 07:00), Max: 36.8 (16 Apr 2025 05:00)  T(F): 98.2 (16 Apr 2025 07:00), Max: 98.3 (16 Apr 2025 05:00)  HR: 73 (16 Apr 2025 09:41) (58 - 94)  BP: 161/69 (16 Apr 2025 08:00) (104/65 - 172/81)  BP(mean): 99 (16 Apr 2025 08:00) (80 - 116)  RR: 34 (16 Apr 2025 09:41) (20 - 36)  SpO2: 96% (16 Apr 2025 09:41) (81% - 100%)    Parameters below as of 16 Apr 2025 09:41  Patient On (Oxygen Delivery Method): nasal cannula  O2 Flow (L/min): 2        15 Apr 2025 07:01  -  16 Apr 2025 07:00  --------------------------------------------------------  IN:    IV PiggyBack: 100 mL    Oral Fluid: 940 mL    Sodium Chloride 0.9% Bolus: 500 mL  Total IN: 1540 mL    OUT:    Chest Tube (mL): 45 mL    Voided (mL): 950 mL  Total OUT: 995 mL    Total NET: 545 mL      16 Apr 2025 07:01  -  16 Apr 2025 09:50  --------------------------------------------------------  IN:    Oral Fluid: 250 mL  Total IN: 250 mL    OUT:  Total OUT: 0 mL    Total NET: 250 mL        ___________________________________________________  PHYSICAL EXAM    -- CONSTITUTIONAL: NAD, lying in bed  -- NEURO: Awake, alert  -- HEENT: NC/AT, mucous membranes moist  -- NECK: Soft, no asymmetry  -- CHEST: breathing comfortably on 2LNC, ttp L chest.   -- ABDOMEN: Nondistended  -- EXTREMITIES: Warm and well perfused    ___________________________________________________  LABS                        8.6    9.04  )-----------( 318      ( 16 Apr 2025 05:43 )             25.9     16 Apr 2025 05:44    131    |  95     |  31     ----------------------------<  87     4.1     |  25     |  0.97     Ca    8.6        16 Apr 2025 05:44  Phos  3.1       16 Apr 2025 05:44  Mg     2.2       16 Apr 2025 05:44    TPro  6.4    /  Alb  3.7    /  TBili  0.3    /  DBili  x      /  AST  38     /  ALT  21     /  AlkPhos  135    14 Apr 2025 14:06    PT/INR - ( 16 Apr 2025 05:43 )   PT: 14.0 sec;   INR: 1.22 ratio         PTT - ( 16 Apr 2025 05:43 )  PTT:27.8 sec  CAPILLARY BLOOD GLUCOSE            Urinalysis Basic - ( 16 Apr 2025 05:44 )    Color: x / Appearance: x / SG: x / pH: x  Gluc: 87 mg/dL / Ketone: x  / Bili: x / Urobili: x   Blood: x / Protein: x / Nitrite: x   Leuk Esterase: x / RBC: x / WBC x   Sq Epi: x / Non Sq Epi: x / Bacteria: x      ___________________________________________________  MICRO  Recent Cultures:    ___________________________________________________  MEDICATIONS  (STANDING):  atorvastatin 20 milliGRAM(s) Oral at bedtime  chlorhexidine 2% Cloths 1 Application(s) Topical <User Schedule>  enoxaparin Injectable 30 milliGRAM(s) SubCutaneous every 24 hours  influenza  Vaccine (HIGH DOSE) 0.5 milliLiter(s) IntraMuscular once  letrozole 2.5 milliGRAM(s) Oral daily  levothyroxine 100 MICROGram(s) Oral <User Schedule>  levothyroxine 50 MICROGram(s) Oral <User Schedule>  lidocaine   4% Patch 1 Patch Transdermal every 24 hours  metoprolol tartrate 25 milliGRAM(s) Oral every 12 hours  multivitamin 1 Tablet(s) Oral daily  pantoprazole    Tablet 40 milliGRAM(s) Oral before breakfast  polyethylene glycol 3350 17 Gram(s) Oral every 24 hours  senna 2 Tablet(s) Oral at bedtime    MEDICATIONS  (PRN):  acetaminophen     Tablet .. 1000 milliGRAM(s) Oral every 6 hours PRN Mild Pain (1 - 3), Moderate Pain (4 - 6)  fluticasone propionate 50 MICROgram(s)/spray Nasal Spray 1 Spray(s) Both Nostrils two times a day PRN Congestion   SUBJECTIVE  The patient was seen and examined. No acute events overnight. Pain controlled, afebrile w/ stable vitals. Left pigtail removed yesterday, CXR stable post-pull. Pulls <500 on IS.    OBJECTIVE  ___________________________________________________  VITAL SIGNS / I&O's   Vital Signs Last 24 Hrs  T(C): 36.8 (16 Apr 2025 07:00), Max: 36.8 (16 Apr 2025 05:00)  T(F): 98.2 (16 Apr 2025 07:00), Max: 98.3 (16 Apr 2025 05:00)  HR: 73 (16 Apr 2025 09:41) (58 - 94)  BP: 161/69 (16 Apr 2025 08:00) (104/65 - 172/81)  BP(mean): 99 (16 Apr 2025 08:00) (80 - 116)  RR: 34 (16 Apr 2025 09:41) (20 - 36)  SpO2: 96% (16 Apr 2025 09:41) (81% - 100%)    Parameters below as of 16 Apr 2025 09:41  Patient On (Oxygen Delivery Method): nasal cannula  O2 Flow (L/min): 2        15 Apr 2025 07:01  -  16 Apr 2025 07:00  --------------------------------------------------------  IN:    IV PiggyBack: 100 mL    Oral Fluid: 940 mL    Sodium Chloride 0.9% Bolus: 500 mL  Total IN: 1540 mL    OUT:    Chest Tube (mL): 45 mL    Voided (mL): 950 mL  Total OUT: 995 mL    Total NET: 545 mL      16 Apr 2025 07:01  -  16 Apr 2025 09:50  --------------------------------------------------------  IN:    Oral Fluid: 250 mL  Total IN: 250 mL    OUT:  Total OUT: 0 mL    Total NET: 250 mL        ___________________________________________________  PHYSICAL EXAM    -- CONSTITUTIONAL: NAD, lying in bed  -- NEURO: Awake, alert  -- HEENT: NC/AT, mucous membranes moist  -- NECK: Soft, no asymmetry  -- CHEST: breathing comfortably on 2LNC, ttp L chest.   -- ABDOMEN: Nondistended  -- EXTREMITIES: Warm and well perfused    ___________________________________________________  LABS                        8.6    9.04  )-----------( 318      ( 16 Apr 2025 05:43 )             25.9     16 Apr 2025 05:44    131    |  95     |  31     ----------------------------<  87     4.1     |  25     |  0.97     Ca    8.6        16 Apr 2025 05:44  Phos  3.1       16 Apr 2025 05:44  Mg     2.2       16 Apr 2025 05:44    TPro  6.4    /  Alb  3.7    /  TBili  0.3    /  DBili  x      /  AST  38     /  ALT  21     /  AlkPhos  135    14 Apr 2025 14:06    PT/INR - ( 16 Apr 2025 05:43 )   PT: 14.0 sec;   INR: 1.22 ratio         PTT - ( 16 Apr 2025 05:43 )  PTT:27.8 sec  CAPILLARY BLOOD GLUCOSE            Urinalysis Basic - ( 16 Apr 2025 05:44 )    Color: x / Appearance: x / SG: x / pH: x  Gluc: 87 mg/dL / Ketone: x  / Bili: x / Urobili: x   Blood: x / Protein: x / Nitrite: x   Leuk Esterase: x / RBC: x / WBC x   Sq Epi: x / Non Sq Epi: x / Bacteria: x      ___________________________________________________  MICRO  Recent Cultures:    ___________________________________________________  MEDICATIONS  (STANDING):  atorvastatin 20 milliGRAM(s) Oral at bedtime  chlorhexidine 2% Cloths 1 Application(s) Topical <User Schedule>  enoxaparin Injectable 30 milliGRAM(s) SubCutaneous every 24 hours  influenza  Vaccine (HIGH DOSE) 0.5 milliLiter(s) IntraMuscular once  letrozole 2.5 milliGRAM(s) Oral daily  levothyroxine 100 MICROGram(s) Oral <User Schedule>  levothyroxine 50 MICROGram(s) Oral <User Schedule>  lidocaine   4% Patch 1 Patch Transdermal every 24 hours  metoprolol tartrate 25 milliGRAM(s) Oral every 12 hours  multivitamin 1 Tablet(s) Oral daily  pantoprazole    Tablet 40 milliGRAM(s) Oral before breakfast  polyethylene glycol 3350 17 Gram(s) Oral every 24 hours  senna 2 Tablet(s) Oral at bedtime    MEDICATIONS  (PRN):  acetaminophen     Tablet .. 1000 milliGRAM(s) Oral every 6 hours PRN Mild Pain (1 - 3), Moderate Pain (4 - 6)  fluticasone propionate 50 MICROgram(s)/spray Nasal Spray 1 Spray(s) Both Nostrils two times a day PRN Congestion

## 2025-04-16 NOTE — ADVANCED PRACTICE NURSE CONSULT - REASON FOR CONSULT
Wound care consult initiated by RN to assess patient's skin for a possible deep tissue injury on sacrum and skin injury on left anterior shin, present on admission    Reason for Admission: Fall  History of Present Illness:   89F PMHx HTN, HLD, temporal arteritis, breast cancer in remission who had a mechanical fall on 03/28/25. After the fall the patient went to her PCP who took a CXR and diagnosised her with rib fractures and recommended rest and Tylenol. Patient presenting to the ED with shortness of breath.  On EMS arrival she was hypoxic to mid 80s, on 2L nasal cannula she improved to mid 90s. As part of the workup the patient got a CT Chest which showed Fractures of the left seventh through the left 12th ribs and Findings suggestive of a large left hemothorax. Mild shift of the mediastinum towards the right is noted. Labs significant for WBC 10. Na 128.

## 2025-04-16 NOTE — PROGRESS NOTE ADULT - NS ATTEND AMEND GEN_ALL_CORE FT
89 yo f, L rib fx, NOAH.  N Multimodal pain management.  P NC 2L, sat >90. CXR no acute changes.  C Off pressors. Lactate 0.7.  G Regular diet.  R Na 131, Cr 0.97. .  H Hgb 8.6.   DVT Lovenox. SCDs.  I WBC 9.0, off abx.  E Monitor glycemia.

## 2025-04-16 NOTE — DISCHARGE NOTE PROVIDER - PROVIDER TOKENS
PROVIDER:[TOKEN:[[id number]],FOLLOWUP:[2 weeks]],PROVIDER:[TOKEN:[[id number]],FOLLOWUP:[2 weeks]]

## 2025-04-16 NOTE — OCCUPATIONAL THERAPY INITIAL EVALUATION ADULT - NSOTDISCHREC_GEN_A_CORE
If pt returns home, pt would require assist with ALL ADL's and functional mobility and home OT. DME: shower chair, rolling walker/[facility]

## 2025-04-16 NOTE — PROGRESS NOTE ADULT - SUBJECTIVE AND OBJECTIVE BOX
24 HOUR EVENTS:  - 500cc NS for hyponatremia  - salt tabs   - Restart home meds: HCTZ  - Starting Lovenox  - pigtail removed, CXR no PTX      NEURO  Exam: A&O x 4  Meds:     RESPIRATORY  RR: 21 (04-16-25 @ 00:00) (19 - 38)  SpO2: 100% (04-16-25 @ 00:00) (94% - 100%)  Exam: unlabored    CARDIOVASCULAR  HR: 67 (04-16-25 @ 00:00) (58 - 94)  BP: 162/74 (04-16-25 @ 00:00) (109/55 - 163/73)  BP(mean): 107 (04-16-25 @ 00:00) (76 - 111)  VBG - ( 15 Apr 2025 06:11 )  pH: 7.38  /  pCO2: 51    /  pO2: 28    / HCO3: 30    / Base Excess: 4.4   /  SaO2: 45.0   Lactate: 1.0          Exam:   Cardiac Rhythm:   Perfusion     [x ]Adequate   [ ]Inadequate  Mentation   [ x]Normal       [ ]Reduced  Extremities  [ x]Warm         [ ]Cool  Volume Status [ ]Hypervolemic [x ]Euvolemic [ ]Hypovolemic  Meds: metoprolol tartrate 25 milliGRAM(s) Oral every 12 hours      GI/NUTRITION  Exam: soft, NT/ND  Diet: regular  Meds: pantoprazole    Tablet 40 milliGRAM(s) Oral before breakfast  polyethylene glycol 3350 17 Gram(s) Oral every 24 hours  senna 2 Tablet(s) Oral at bedtime      GENITOURINARY  I&O's Detail    04-14 @ 07:01  -  04-15 @ 07:00  --------------------------------------------------------  IN:    IV PiggyBack: 350 mL    Oral Fluid: 480 mL  Total IN: 830 mL    OUT:    Chest Tube (mL): 2210 mL    Voided (mL): 590 mL  Total OUT: 2800 mL    Total NET: -1970 mL      04-15 @ 07:01  -  04-16 @ 01:01  --------------------------------------------------------  IN:    IV PiggyBack: 100 mL    Oral Fluid: 700 mL    Sodium Chloride 0.9% Bolus: 500 mL  Total IN: 1300 mL    OUT:    Chest Tube (mL): 45 mL    Voided (mL): 100 mL  Total OUT: 145 mL    Total NET: 1155 mL          04-15    127[L]  |  92[L]  |  35[H]  ----------------------------<  102[H]  4.6   |  25  |  1.05    Ca    8.5      15 Apr 2025 20:50  Phos  3.2     04-15  Mg     2.2     04-15    TPro  6.4  /  Alb  3.7  /  TBili  0.3  /  DBili  x   /  AST  38  /  ALT  21  /  AlkPhos  135[H]  04-14    Meds: multivitamin 1 Tablet(s) Oral daily  sodium chloride 1 Gram(s) Oral two times a day      HEMATOLOGIC  Meds: enoxaparin Injectable 30 milliGRAM(s) SubCutaneous every 24 hours                          8.8    8.51  )-----------( 305      ( 15 Apr 2025 06:15 )             26.1     PT/INR - ( 15 Apr 2025 06:15 )   PT: 13.2 sec;   INR: 1.16 ratio         PTT - ( 15 Apr 2025 06:15 )  PTT:23.9 sec    INFECTIOUS DISEASES  T(C): 36.7 (04-15-25 @ 22:00), Max: 36.7 (04-15-25 @ 07:00)  Wt(kg): --  WBC Count: 8.51 K/uL (04-15 @ 06:15)    Recent Cultures:    Meds: influenza  Vaccine (HIGH DOSE) 0.5 milliLiter(s) IntraMuscular once      ENDOCRINE  Capillary Blood Glucose    Meds: atorvastatin 20 milliGRAM(s) Oral at bedtime  levothyroxine 100 MICROGram(s) Oral <User Schedule>  levothyroxine 50 MICROGram(s) Oral <User Schedule>    OTHER MEDICATIONS:  chlorhexidine 2% Cloths 1 Application(s) Topical <User Schedule>  fluticasone propionate 50 MICROgram(s)/spray Nasal Spray 1 Spray(s) Both Nostrils two times a day PRN  letrozole 2.5 milliGRAM(s) Oral daily  lidocaine   4% Patch 1 Patch Transdermal every 24 hours      IMAGING:

## 2025-04-16 NOTE — CONSULT NOTE ADULT - SUBJECTIVE AND OBJECTIVE BOX
HPI:  89F PMHx HTN, HLD, temporal arteritis, breast cancer in remission who had a mechanical fall on 03/28/25. After the fall the patient went to her PCP who took a CXR and diagnosised her with rib fractures and recommended rest and Tylenol. Patient presenting to the ED with shortness of breath.  On EMS arrival she was hypoxic to mid 80s, on 2L nasal cannula she improved to mid 90s. As part of the workup the patient got a CT Chest which showed Fractures of the left seventh through the left 12th ribs and Findings suggestive of a large left hemothorax. Mild shift of the mediastinum towards the right is noted. Labs significant for WBC 10. Na 128. s/p CT.     (14 Apr 2025 16:17)      PAST MEDICAL & SURGICAL HISTORY:      Review of Systems:   CONSTITUTIONAL: No fever, weight loss, or fatigue  EYES: No eye pain, visual disturbances, or discharge  ENMT:  No difficulty hearing, tinnitus, vertigo; No sinus or throat pain  NECK: No pain or stiffness  BREASTS: No pain, masses, or nipple discharge  RESPIRATORY: No cough, wheezing, chills or hemoptysis; + shortness of breath  CARDIOVASCULAR: No chest pain, palpitations, dizziness, or leg swelling  GASTROINTESTINAL: No abdominal or epigastric pain. No nausea, vomiting, or hematemesis; No diarrhea or constipation. No melena or hematochezia.  GENITOURINARY: No dysuria, frequency, hematuria, or incontinence  NEUROLOGICAL: No headaches, memory loss, loss of strength, numbness, or tremors  SKIN: No itching, burning, rashes, or lesions   LYMPH NODES: No enlarged glands  ENDOCRINE: No heat or cold intolerance; No hair loss  MUSCULOSKELETAL: No joint pain or swelling; No muscle, back, or extremity pain  PSYCHIATRIC: No depression, anxiety, mood swings, or difficulty sleeping  HEME/LYMPH: No easy bruising, or bleeding gums  ALLERY AND IMMUNOLOGIC: No hives or eczema    Allergies    No Known Allergies    Intolerances        Social History:     FAMILY HISTORY:      MEDICATIONS  (STANDING):  atorvastatin 20 milliGRAM(s) Oral at bedtime  chlorhexidine 2% Cloths 1 Application(s) Topical <User Schedule>  enoxaparin Injectable 30 milliGRAM(s) SubCutaneous every 24 hours  influenza  Vaccine (HIGH DOSE) 0.5 milliLiter(s) IntraMuscular once  letrozole 2.5 milliGRAM(s) Oral daily  levothyroxine 100 MICROGram(s) Oral <User Schedule>  levothyroxine 50 MICROGram(s) Oral <User Schedule>  lidocaine   4% Patch 1 Patch Transdermal every 24 hours  metoprolol tartrate 25 milliGRAM(s) Oral every 12 hours  multivitamin 1 Tablet(s) Oral daily  pantoprazole    Tablet 40 milliGRAM(s) Oral before breakfast  polyethylene glycol 3350 17 Gram(s) Oral every 24 hours  senna 2 Tablet(s) Oral at bedtime    MEDICATIONS  (PRN):  acetaminophen     Tablet .. 1000 milliGRAM(s) Oral every 6 hours PRN Mild Pain (1 - 3), Moderate Pain (4 - 6)  fluticasone propionate 50 MICROgram(s)/spray Nasal Spray 1 Spray(s) Both Nostrils two times a day PRN Congestion        CAPILLARY BLOOD GLUCOSE        I&O's Summary    15 Apr 2025 07:01  -  16 Apr 2025 07:00  --------------------------------------------------------  IN: 1540 mL / OUT: 995 mL / NET: 545 mL    16 Apr 2025 07:01  -  16 Apr 2025 18:48  --------------------------------------------------------  IN: 800 mL / OUT: 500 mL / NET: 300 mL        PHYSICAL EXAM:  GENERAL: NAD   HEAD:  Atraumatic, Normocephalic  EYES: EOMI, PERRLA, conjunctiva and sclera clear   NECK: Supple, No JVD  CHEST/LUNG: Clear to auscultation bilaterally; No wheeze   HEART: Regular rate and rhythm; No murmurs, rubs, or gallops  ABDOMEN: Soft, Nontender, Nondistended; Bowel sounds present  EXTREMITIES:  2+ Peripheral Pulses, No clubbing, cyanosis, or edema   PSYCH: AAOx3   NEUROLOGY: non-focal  SKIN: No rashes or lesions    LABS:                        8.6    9.04  )-----------( 318      ( 16 Apr 2025 05:43 )             25.9     04-16    131[L]  |  95[L]  |  31[H]  ----------------------------<  87  4.1   |  25  |  0.97    Ca    8.6      16 Apr 2025 05:44  Phos  3.1     04-16  Mg     2.2     04-16      PT/INR - ( 16 Apr 2025 05:43 )   PT: 14.0 sec;   INR: 1.22 ratio         PTT - ( 16 Apr 2025 05:43 )  PTT:27.8 sec      Urinalysis Basic - ( 16 Apr 2025 05:44 )    Color: x / Appearance: x / SG: x / pH: x  Gluc: 87 mg/dL / Ketone: x  / Bili: x / Urobili: x   Blood: x / Protein: x / Nitrite: x   Leuk Esterase: x / RBC: x / WBC x   Sq Epi: x / Non Sq Epi: x / Bacteria: x        RADIOLOGY & ADDITIONAL TESTS:    Imaging Personally Reviewed:   < from: Xray Chest 1 View- PORTABLE-Routine (Xray Chest 1 View- PORTABLE-Routine in AM.) (04.16.25 @ 07:26) >  IMPRESSION:  Stable small left pleural effusion with associated atelectasis.    No pneumothorax status post removal of left-sided chest tube.    < end of copied text >    Consultant(s) Notes Reviewed:      Care Discussed with Consultants/Other Providers:  
Patient is a 89y old  Female who presents with a chief complaint of Fall (16 Apr 2025 11:07)      HPI:  89F PMHx HTN, HLD, temporal arteritis, breast cancer in remission who had a mechanical fall on 03/28/25. After the fall the patient went to her PCP who took a CXR and diagnosised her with rib fractures and recommended rest and Tylenol. Patient presenting to the ED with shortness of breath.  On EMS arrival she was hypoxic to mid 80s, on 2L nasal cannula she improved to mid 90s. As part of the workup the patient got a CT Chest which showed Fractures of the left seventh through the left 12th ribs and Findings suggestive of a large left hemothorax. Mild shift of the mediastinum towards the right is noted. Labs significant for WBC 10. Na 128.     (14 Apr 2025 16:17)      PAST MEDICAL & SURGICAL HISTORY:      MEDICATIONS  (STANDING):  atorvastatin 20 milliGRAM(s) Oral at bedtime  chlorhexidine 2% Cloths 1 Application(s) Topical <User Schedule>  enoxaparin Injectable 30 milliGRAM(s) SubCutaneous every 24 hours  influenza  Vaccine (HIGH DOSE) 0.5 milliLiter(s) IntraMuscular once  letrozole 2.5 milliGRAM(s) Oral daily  levothyroxine 100 MICROGram(s) Oral <User Schedule>  levothyroxine 50 MICROGram(s) Oral <User Schedule>  lidocaine   4% Patch 1 Patch Transdermal every 24 hours  metoprolol tartrate 25 milliGRAM(s) Oral every 12 hours  multivitamin 1 Tablet(s) Oral daily  pantoprazole    Tablet 40 milliGRAM(s) Oral before breakfast  polyethylene glycol 3350 17 Gram(s) Oral every 24 hours  senna 2 Tablet(s) Oral at bedtime    MEDICATIONS  (PRN):  acetaminophen     Tablet .. 1000 milliGRAM(s) Oral every 6 hours PRN Mild Pain (1 - 3), Moderate Pain (4 - 6)  fluticasone propionate 50 MICROgram(s)/spray Nasal Spray 1 Spray(s) Both Nostrils two times a day PRN Congestion      Allergies    No Known Allergies    Intolerances        VITALS:    Vital Signs Last 24 Hrs  T(C): 36.7 (16 Apr 2025 15:00), Max: 36.8 (16 Apr 2025 05:00)  T(F): 98.1 (16 Apr 2025 15:00), Max: 98.3 (16 Apr 2025 05:00)  HR: 100 (16 Apr 2025 16:00) (58 - 101)  BP: 100/57 (16 Apr 2025 16:00) (100/57 - 172/81)  BP(mean): 73 (16 Apr 2025 16:00) (73 - 116)  RR: 36 (16 Apr 2025 16:00) (20 - 55)  SpO2: 93% (16 Apr 2025 16:00) (81% - 100%)    Parameters below as of 16 Apr 2025 15:00  Patient On (Oxygen Delivery Method): nasal cannula  O2 Flow (L/min): 2      LABS:                          8.6    9.04  )-----------( 318      ( 16 Apr 2025 05:43 )             25.9       04-16    131[L]  |  95[L]  |  31[H]  ----------------------------<  87  4.1   |  25  |  0.97    Ca    8.6      16 Apr 2025 05:44  Phos  3.1     04-16  Mg     2.2     04-16        CAPILLARY BLOOD GLUCOSE          PT/INR - ( 16 Apr 2025 05:43 )   PT: 14.0 sec;   INR: 1.22 ratio         PTT - ( 16 Apr 2025 05:43 )  PTT:27.8 sec    LOWER EXTREMITY PHYSICAL EXAM:    Vascular: DP/PT 0/4, B/L, CFT <3 seconds B/L, Temperature gradient warm to cool, B/L. +3 pitting edema of bilateral foot and ankle   Neuro: Epicritic sensation intact to the level of digits, B/L.  Musculoskeletal/Ortho: unremarkable   Skin: No open lesions or acute signs of infection of bilateral foot. Left foot 2nd digit corn, no acute signs of infection.       RADIOLOGY & ADDITIONAL STUDIES:    
HISTORY OF PRESENT ILLNESS:  WILLIAMS YUN is a 89y Female w/ PMHx of HTN, HLD, temporal arteritis, breast cancer in remission who had a mechanical fall on 03/28/25. After the fall the patient went to her PCP who took a CXR and diagnosed her with rib fractures and recommended rest and Tylenol. Patient presented to the ED with shortness of breath and increased work of breathing.  On EMS arrival she was hypoxic to mid 80s, on 2L nasal cannula she improved to mid 90s. As part of the workup the patient got a CT Chest which showed Fractures of the left seventh through the left 12th ribs and findings suggestive of a large left hemothorax / mild shift of the mediastinum towards the right noted Labs significant for WBC 10. Na 128.    ALLERGIES: No Known Allergies      VITAL SIGNS:  ICU Vital Signs Last 24 Hrs  T(C): 36.4 (14 Apr 2025 19:46), Max: 36.7 (14 Apr 2025 12:56)  T(F): 97.6 (14 Apr 2025 19:46), Max: 98 (14 Apr 2025 12:56)  HR: 70 (14 Apr 2025 21:00) (69 - 81)  BP: 120/60 (14 Apr 2025 21:00) (120/60 - 174/80)  BP(mean): 86 (14 Apr 2025 21:00) (82 - 91)  ABP: --  ABP(mean): --  RR: 17 (14 Apr 2025 21:00) (17 - 28)  SpO2: 100% (14 Apr 2025 21:00) (94% - 100%)    O2 Parameters below as of 14 Apr 2025 19:46  Patient On (Oxygen Delivery Method): nasal cannula  O2 Flow (L/min): 4          NEURO  Exam: AxO4, mentating well, no focal deficits  acetaminophen   IVPB .. 1000 milliGRAM(s) IV Intermittent every 6 hours      RESPIRATORY    Exam: Lungs clear b/l, slightly diminished breath sounds at left base. No acute evidence of respiratory distress.       CARDIOVASCULAR  VBG - ( 14 Apr 2025 20:25 )  pH: 7.33  /  pCO2: 42    /  pO2: 32    / HCO3: 22    / Base Excess: -3.5  /  SaO2: 49.0   Lactate: 0.9              Exam: Normal S1, S2, no MRG. Trace pedal edema.  Cardiac Rhythm: NSR  metoprolol tartrate 25 milliGRAM(s) Oral every 12 hours      GI/NUTRITION  Exam: Soft, NT, ND  Diet: Regular  pantoprazole    Tablet 40 milliGRAM(s) Oral before breakfast      GENITOURINARY/RENAL  multivitamin 1 Tablet(s) Oral daily      04-14 @ 07:01  -  04-14 @ 21:08  --------------------------------------------------------  IN:    IV PiggyBack: 100 mL  Total IN: 100 mL    OUT:    Chest Tube (mL): 2110 mL    Voided (mL): 170 mL  Total OUT: 2280 mL    Total NET: -2180 mL        Weight (kg): 50.5 (04-14 @ 19:46)  04-14    128[L]  |  86[L]  |  42[H]  ----------------------------<  92  3.8   |  23  |  1.17    Ca    9.3      14 Apr 2025 14:06    TPro  6.4  /  Alb  3.7  /  TBili  0.3  /  DBili  x   /  AST  38  /  ALT  21  /  AlkPhos  135[H]  04-14    [ ] Wang catheter, indication: urine output monitoring in critically ill patient    HEMATOLOGIC  [ x ] VTE Prophylaxis: SCD in setting of hemothorax                          8.1    10.10 )-----------( 289      ( 14 Apr 2025 20:33 )             23.3     PT/INR - ( 14 Apr 2025 20:33 )   PT: 13.5 sec;   INR: 1.19 ratio         PTT - ( 14 Apr 2025 20:33 )  PTT:24.0 sec  Transfusion: [ ] PRBC	[ ] Platelets	[ ] FFP	[ ] Cryoprecipitate      INFECTIOUS DISEASES  influenza  Vaccine (HIGH DOSE) 0.5 milliLiter(s) IntraMuscular once    RECENT CULTURES:      ENDOCRINE  atorvastatin 20 milliGRAM(s) Oral at bedtime  levothyroxine 100 MICROGram(s) Oral <User Schedule>  levothyroxine 50 MICROGram(s) Oral <User Schedule>    CAPILLARY BLOOD GLUCOSE          PATIENT CARE ACCESS DEVICES:  [ x ] Peripheral IV  [ ] Central Venous Line	[ ] R	[ ] L	[ ] IJ	[ ] Fem	[ ] SC	Placed:   [ ] Arterial Line		[ ] R	[ ] L	[ ] Fem	[ ] Rad	[ ] Ax	Placed:   [ ] PICC:					[ ] Mediport  [ ] Urinary Catheter, Date Placed:   [x] Necessity of urinary, arterial, and venous catheters discussed    OTHER MEDICATIONS: letrozole 2.5 milliGRAM(s) Oral daily      IMAGING STUDIES:    < from: CT Angio Chest PE Protocol w/ IV Cont (04.14.25 @ 16:18) >  ACC: [id number] EXAM:  CT ABDOMEN AND PELVIS IC   ORDERED BY:  AMBROSE SULLIVAN     ACC: [id number] EXAM:  CT ANGIO CHEST PULM ART WAWIC   ORDERED BY:  AMBROSE SULLIVAN     IMPRESSION: No pulmonary embolus is noted.    Fractures of the left seventh through the left 12th ribs.    Findings suggestive of a large left hemothorax. Mild shift of the   mediastinum towards the right is noted.      < from: Xray Chest 1 View-PORTABLE IMMEDIATE (Xray Chest 1 View-PORTABLE IMMEDIATE .) (04.14.25 @ 18:48) >  ACC: [id number] EXAM:  XR CHEST PORTABLE IMMED 1V   ORDERED BY:  AMBROSE SULLIVAN     INTERPRETATION:  EXAMINATION: XR CHEST IMMEDIATE    CLINICAL INDICATION: post pigtail    IMPRESSION:  Small left pleural effusion with underlying patchy opacities.  Left-sided pigtail catheter.        
TRAUMA/ACUTE CARE SURGERY - HOSPITAL MEDICINE CO-MANAGEMENT INITIAL VISIT NOTE  [facility] Division of Hospital Medicine  Joanie Diane DO  MS Teams PREFERRED    CHIEF COMPLAINT: Patient is a 89y old  Female who presents with a chief complaint of Fall (15 Apr 2025 12:44)      HPI: 89F PMHx HTN, HLD, temporal arteritis, breast cancer in remission who had a mechanical fall on 03/28/25. After the fall the patient went to her PCP who took a CXR and diagnosised her with rib fractures and recommended rest and Tylenol. Patient presenting to the ED with shortness of breath.  On EMS arrival she was hypoxic to mid 80s, on 2L nasal cannula she improved to mid 90s. As part of the workup the patient got a CT Chest which showed Fractures of the left seventh through the left 12th ribs and Findings suggestive of a large left hemothorax. Mild shift of the mediastinum towards the right is noted. Labs significant for WBC 10. Na 128.    Patient seen and examined at bedside. At present, pain is controlled. Denies fever, chills, chest pain, dyspnea, N/V, abdominal pain.  She reports last BM 2 days ago.     History limited due to: [ ] Dementia  [ ] Delirium  [ ] Condition    Allergies    No Known Allergies    Intolerances        HOME MEDICATIONS: [X] Reviewed  Home Medications:  FLUTICASONE PROP 50 MCG SPRAY: instill 1 TO 2 sprays into each nostril once daily if needed (15 Apr 2025 08:31)  HYDROCHLOROTHIAZIDE 12.5 MG TB: take 2 tablets once daily (15 Apr 2025 08:31)  LETROZOLE 2.5 MG TABLET: take 1 tablet by mouth once daily (15 Apr 2025 08:31)  LEVOTHYROXINE 100 MCG TABLET: take 1 tablet by mouth every morning (15 Apr 2025 08:31)  METOPROLOL SUCC ER 25 MG TAB: take 1 tablet by mouth twice a day (15 Apr 2025 08:31)  POTASSIUM CL ER 10 MEQ TABLET: take 1 tablet by mouth once daily (15 Apr 2025 08:31)  PRAVASTATIN SODIUM 40 MG TAB: take 1 tablet by mouth at bedtime (15 Apr 2025 08:31)  Prolia 60 mg/mL subcutaneous solution: 60 milligram(s) subcutaneously every 6 months (15 Apr 2025 08:33)  Protonix 40 mg oral delayed release tablet: 1 tab(s) orally once a day (15 Apr 2025 08:33)      MEDICATIONS  (STANDING):  acetaminophen   IVPB .. 1000 milliGRAM(s) IV Intermittent every 6 hours  atorvastatin 20 milliGRAM(s) Oral at bedtime  chlorhexidine 2% Cloths 1 Application(s) Topical <User Schedule>  enoxaparin Injectable 30 milliGRAM(s) SubCutaneous every 24 hours  hydrochlorothiazide 25 milliGRAM(s) Oral daily  influenza  Vaccine (HIGH DOSE) 0.5 milliLiter(s) IntraMuscular once  letrozole 2.5 milliGRAM(s) Oral daily  levothyroxine 100 MICROGram(s) Oral <User Schedule>  levothyroxine 50 MICROGram(s) Oral <User Schedule>  lidocaine   4% Patch 1 Patch Transdermal every 24 hours  metoprolol tartrate 25 milliGRAM(s) Oral every 12 hours  multivitamin 1 Tablet(s) Oral daily  pantoprazole    Tablet 40 milliGRAM(s) Oral before breakfast  polyethylene glycol 3350 17 Gram(s) Oral every 24 hours  senna 2 Tablet(s) Oral at bedtime  sodium chloride 1 Gram(s) Oral two times a day    MEDICATIONS  (PRN):  fluticasone propionate 50 MICROgram(s)/spray Nasal Spray 1 Spray(s) Both Nostrils two times a day PRN Congestion      PAST MEDICAL & SURGICAL HISTORY:  [X] Reviewed     Functional Assessment: [ ] Independent  [X] Assistance  [ ] Total care  [ ] Non-ambulatory    SOCIAL HISTORY:  Residence: [ ] [facility]  [ ] SNF  [X] Community - Lives with sister  [ ] Substance abuse:   [X] Tobacco: Denies   [X] Alcohol use: Denies    FAMILY HISTORY:  [X] No pertinent family history in first degree relatives     REVIEW OF SYSTEMS:  See above     PHYSICAL EXAM:    Vital Signs Last 24 Hrs  T(C): 36.7 (15 Apr 2025 07:00), Max: 36.7 (14 Apr 2025 17:30)  T(F): 98.1 (15 Apr 2025 07:00), Max: 98.1 (15 Apr 2025 07:00)  HR: 61 (15 Apr 2025 09:00) (61 - 78)  BP: 123/60 (15 Apr 2025 09:00) (104/58 - 168/80)  BP(mean): 87 (15 Apr 2025 09:00) (76 - 95)  RR: 38 (15 Apr 2025 09:00) (17 - 38)  SpO2: 99% (15 Apr 2025 09:00) (95% - 100%)    Parameters below as of 15 Apr 2025 07:00  Patient On (Oxygen Delivery Method): nasal cannula  O2 Flow (L/min): 3      CONSTITUTIONAL: NAD   EYES: Conjunctiva and sclera clear  ENMT: Moist oral mucosa, no pharyngeal injection or exudates   NECK: Supple, midline  RESPIRATORY: Normal respiratory effort; chest tube in place; on 3L NC  CARDIOVASCULAR: Regular rate and rhythm, normal S1 and S2.   ABDOMEN: Nontender to palpation, no rebound/guarding  PSYCH: A+O to person, place, and time; affect appropriate      LABS:                        8.8    8.51  )-----------( 305      ( 15 Apr 2025 06:15 )             26.1     Hemoglobin: 8.8 g/dL (04-15 @ 06:15)  Hemoglobin: 8.1 g/dL (04-14 @ 20:33)  Hemoglobin: 8.5 g/dL (04-14 @ 18:37)  Hemoglobin: 9.1 g/dL (04-14 @ 14:06)    04-15    124[L]  |  87[L]  |  33[H]  ----------------------------<  97  4.6   |  25  |  1.11    Ca    9.1      15 Apr 2025 13:23  Phos  3.3     04-15  Mg     2.4     04-15    TPro  6.4  /  Alb  3.7  /  TBili  0.3  /  DBili  x   /  AST  38  /  ALT  21  /  AlkPhos  135[H]  04-14    PT/INR - ( 15 Apr 2025 06:15 )   PT: 13.2 sec;   INR: 1.16 ratio         PTT - ( 15 Apr 2025 06:15 )  PTT:23.9 sec  Urinalysis Basic - ( 15 Apr 2025 13:23 )    Color: x / Appearance: x / SG: x / pH: x  Gluc: 97 mg/dL / Ketone: x  / Bili: x / Urobili: x   Blood: x / Protein: x / Nitrite: x   Leuk Esterase: x / RBC: x / WBC x   Sq Epi: x / Non Sq Epi: x / Bacteria: x      CAPILLARY BLOOD GLUCOSE            RADIOLOGY & ADDITIONAL STUDIES:    EKG:   Personally Reviewed:  [ ] YES     Imaging:   Personally Reviewed:  [ ] YES               [ ] Consultant(s) Notes Reviewed  [ ] Care Discussed with Consultants/Other Providers:      [ ] Fall risks identified:    [ ] High risk medications identified:    [ ] Probable osteoporosis    [ ] Possible osteomalacia    [ ] Increased delirium risk    [X] Delirium and other risks can be reduced by:          -early ambulation          -minimizing "tethers" - IV, oxygen, catheters, etc          -avoiding hypnotics and sedatives          -maintaining hydration/nutrition          -avoid anticholinergics - diphenhydramine, etc          -pain control          -supportive environment    Advanced Directives: [ ] DNR  [ ] No feeding tube  [ ] MOLST in chart  [ ] MOLST completed today  [ ] Unknown

## 2025-04-16 NOTE — DISCHARGE NOTE PROVIDER - NSDCFUADDAPPT_GEN_ALL_CORE_FT
Podiatry Discharge Instructions:  Follow up: Please follow up with Dr. Lopez within 1 week of discharge from the hospital, please call [phone] for appointment and discuss that you recently were seen in the hospital.  Please offload foot in z flow boots when in bed or chair

## 2025-04-16 NOTE — DISCHARGE NOTE PROVIDER - NSDCCPCAREPLAN_GEN_ALL_CORE_FT
PRINCIPAL DISCHARGE DIAGNOSIS  Diagnosis: Rib fracture  Assessment and Plan of Treatment: You may take tylenol and motrin around the clock.  Please stagger these medications.  You may take narcotic pain medication for breakthrough (severe) pain not relieved with medications.  -You can call the acute care surgery office with any questions related to your hospital stay.   -You can take Tylenol as needed for mild to moderate pain Please be sure not exceed 4000mg of acetaminophen(Tylenol) in a 24 hour period.   -You can purchase over-the-counter Lidocaine patches such as SalonPas for topical pain relief. You can apply these to your chest wall once daily.  -If you experience fever > 101, pain not controlled with oxycodone, persistent nausea/vomiting please call your surgeon or return to emergency room immediately.    -Use your incentive spirometer every hour for deep breathing exercises.  Please follow up with Dr. Puentes or one of their partners: Dr. Patterson, Dr. Ovalle, Dr. Smallwood, Dr. Minaya, Dr. Richardson, Dr. Mcleod, Dr. Carey, Dr. Mckeon, Dr. Burnette or Dr. Banuelos.  Please call [phone] to schedule an appointment in 2-4 weeks

## 2025-04-16 NOTE — ADVANCED PRACTICE NURSE CONSULT - RECOMMEDATIONS
Impression:    B/L buttocks/sacral deep tissue injury present on admission  B/L ischium hyperpigmentation, cannot rule out a deep tissue injury present on admission  B/L heel hyperpigmentation, cannot rule out a deep tissue injury present on admission  plantar calluses  left anterior shin wound   left 2nd toe wound     Recommendations:    1) turn and position q2 and PRN utilizing offloading assistive devices  2) routine pericare daily and PRN soiling  3) encourage optimal nutrition  4) waffle cushion or pillow on seat when oob to chair  5) B/L LE complete cair air fluidized boots or radha-lock pillow to offload heels/feet  6) jaxon protective barrier cream to B/L buttocks/sacrum/ischium daily and PRN soiling  7) incontinence management - consider external urinary catheter to divert urine from skin if incontinent  8) sween 24 moisturizer to dry skin daily   9) left anterior shin wound - consider dermatology for treatment recommendations   10) may follow up with podiatry for calluses as an outpatient at [address] [place] [address] [phone]   11) consider podiatry for treatment recommendations regarding feet/toes/ankles    Plan discussed with SABRINA Roe at bedside     For questions/comments regarding the recommendations in this consult, please contact Isabelle Barboza via [employer] Teams. Wound care will not actively follow. For new concerns, please enter new consult. Thank you!     Impression:    B/L buttocks/sacral deep tissue injury present on admission  B/L ischium hyperpigmentation, cannot rule out a deep tissue injury present on admission  B/L heel hyperpigmentation, cannot rule out a deep tissue injury present on admission  plantar calluses  left anterior shin wound - dermatology recommended   left 2nd toe wound - podiatry recommended    Recommendations:    1) turn and position q2 and PRN utilizing offloading assistive devices  2) routine pericare daily and PRN soiling  3) encourage optimal nutrition  4) waffle cushion or pillow on seat when oob to chair  5) B/L LE complete cair air fluidized boots or radha-lock pillow to offload heels/feet  6) jaxon protective barrier cream to B/L buttocks/sacrum/ischium daily and PRN soiling  7) incontinence management - consider external urinary catheter to divert urine from skin if incontinent  8) sween 24 moisturizer to dry skin daily   9) left anterior shin wound - consider dermatology for treatment recommendations   10) may follow up with podiatry for calluses as an outpatient at [address] [place] [address] [phone]   11) consider podiatry for treatment recommendations regarding feet/toes/ankles    Plan discussed with SABRINA Roe at bedside     For questions/comments regarding the recommendations in this consult, please contact Isabelle Barboza via [employer] Teams. Wound care will not actively follow. For new concerns, please enter new consult. Thank you!

## 2025-04-16 NOTE — PROGRESS NOTE ADULT - PROBLEM SELECTOR PLAN 3
Na decreased, likely due to poor oral intake, HCTZ use  Now improving  Encourage oral intake  Started on salt tablets by SICU - now discontinued  Monitor BMP.

## 2025-04-16 NOTE — OCCUPATIONAL THERAPY INITIAL EVALUATION ADULT - ADDITIONAL COMMENTS
Pt lives at home with sister in a garden apartment, +4+2 steps to enter, +2 steps inside, Prior to fall, pt ambulates independently, occ use of cane; ind ADLs, recently since fall at end of March, pts sister has been assisting with ADLs- lower body dressing/showers/ambulating; +tub shower,

## 2025-04-16 NOTE — DISCHARGE NOTE PROVIDER - HOSPITAL COURSE
89F PMHx HTN, HLD, temporal arteritis, breast cancer in remission who had a mechanical fall on 03/28/25. After the fall the patient went to her PCP who took a CXR and diagnosised her with rib fractures and recommended rest and Tylenol. Patient presenting to the ED with shortness of breath.  On EMS arrival she was hypoxic to mid 80s, on 2L nasal cannula she improved to mid 90s. As part of the workup the patient got a CT Chest which showed Fractures of the left seventh through the left 12th ribs and Findings suggestive of a large left hemothorax. Mild shift of the mediastinum towards the right is noted. Labs significant for WBC 10. Na 128.  She was admitted to the acute care surgical service.  Pigtail catheter placed in ED  2L of sanguinous (mixed new and old) output, w/ immediate improvement in resp status.  She was monitored in SICU for respiratory distress, which improved.  Tertiary exam showed no additional injuries.  Multimodal pain regimen was provided.  She was evaluated by PT/OT who recommended subacute rehab on discharge.  Wound care 89F PMHx HTN, HLD, temporal arteritis, breast cancer in remission who had a mechanical fall on 03/28/25. After the fall the patient went to her PCP who took a CXR and diagnosised her with rib fractures and recommended rest and Tylenol. Patient presenting to the ED with shortness of breath.  On EMS arrival she was hypoxic to mid 80s, on 2L nasal cannula she improved to mid 90s. As part of the workup the patient got a CT Chest which showed Fractures of the left seventh through the left 12th ribs and Findings suggestive of a large left hemothorax. Mild shift of the mediastinum towards the right is noted. Labs significant for WBC 10. Na 128.  She was admitted to the acute care surgical service.  Pigtail catheter placed in ED  2L of sanguinous (mixed new and old) output, w/ immediate improvement in resp status.  She was monitored in SICU for respiratory distress, which improved.  Tertiary exam showed no additional injuries.  Multimodal pain regimen was provided.  She was evaluated by PT/OT who recommended subacute rehab on discharge.  Wound care was consulted, recommended podiatry eval.  Podiatry was consulted, recommended outpatient follow-up.  Pigtail was removed.  She remained hemodynamically stable and was transferred to a surgical floor.  She was evaluated by PT who recommended IONA.  She is ambulating with assistance, is voiding, tolerating a diet and is stable for discharge home.  She will follow-up with Dr. Puentes or one of his partners, podiatry, and her PMD within 1-2 weeks.   89F PMHx HTN, HLD, temporal arteritis, breast cancer in remission who had a mechanical fall on 03/28/25. After the fall the patient went to her PCP who took a CXR and diagnosised her with rib fractures and recommended rest and Tylenol. Patient presenting to the ED with shortness of breath.  On EMS arrival she was hypoxic to mid 80s, on 2L nasal cannula she improved to mid 90s. As part of the workup the patient got a CT Chest which showed Fractures of the left seventh through the left 12th ribs and Findings suggestive of a large left hemothorax. Mild shift of the mediastinum towards the right is noted. Labs significant for WBC 10. Na 128.  She was admitted to the acute care surgical service.  Pigtail catheter placed in ED  2L of sanguinous (mixed new and old) output, w/ immediate improvement in resp status.  She was monitored in SICU for respiratory distress, which improved.  Tertiary exam showed no additional injuries.  Multimodal pain regimen was provided.  She was evaluated by PT/OT who recommended subacute rehab on discharge.  Wound care was consulted, recommended podiatry eval.  Podiatry was consulted, recommended outpatient follow-up.  Pigtail was removed.  She remained hemodynamically stable and was transferred to a surgical floor.  She had hyponatremia that resolved with HCTZ being held.  She was evaluated by PT who recommended IONA.  She is ambulating with assistance, is voiding, tolerating a diet and is stable for discharge home.  She will follow-up with Dr. Puentes or one of his partners, podiatry, and her PMD within 1-2 weeks.

## 2025-04-16 NOTE — DISCHARGE NOTE PROVIDER - NSDCMRMEDTOKEN_GEN_ALL_CORE_FT
FLUTICASONE PROP 50 MCG SPRAY: instill 1 TO 2 sprays into each nostril once daily if needed  HYDROCHLOROTHIAZIDE 12.5 MG TB: take 2 tablets once daily  LETROZOLE 2.5 MG TABLET: take 1 tablet by mouth once daily  LEVOTHYROXINE 100 MCG TABLET: take 1 tablet by mouth every morning  METOPROLOL SUCC ER 25 MG TAB: take 1 tablet by mouth twice a day  POTASSIUM CL ER 10 MEQ TABLET: take 1 tablet by mouth once daily  PRAVASTATIN SODIUM 40 MG TAB: take 1 tablet by mouth at bedtime  Prolia 60 mg/mL subcutaneous solution: 60 milligram(s) subcutaneously every 6 months  Protonix 40 mg oral delayed release tablet: 1 tab(s) orally once a day   acetaminophen 500 mg oral tablet: 2 tab(s) orally every 6 hours As needed Mild Pain (1 - 3), Moderate Pain (4 - 6)  atorvastatin 20 mg oral tablet: 1 tab(s) orally once a day (at bedtime)  fluticasone 50 mcg/inh nasal spray: 1 spray(s) nasal 2 times a day As needed Congestion  letrozole 2.5 mg oral tablet: 1 tab(s) orally once a day  levothyroxine 100 mcg (0.1 mg) oral tablet: 1 tab(s) orally once a day Monday through Friday  levothyroxine 50 mcg (0.05 mg) oral tablet: 1 tab(s) orally once a day Saturday and Sunday  lidocaine 4% topical film: Apply topically to affected area every 6 months  metoprolol tartrate 25 mg oral tablet: 1 tab(s) orally every 12 hours  Multiple Vitamins oral tablet: 1 tab(s) orally once a day  polyethylene glycol 3350 oral powder for reconstitution: 17 gram(s) orally every 24 hours  Prolia 60 mg/mL subcutaneous solution: 60 milligram(s) subcutaneously every 6 months  Protonix 40 mg oral delayed release tablet: 1 tab(s) orally once a day  senna leaf extract oral tablet: 2 tab(s) orally once a day (at bedtime)

## 2025-04-16 NOTE — DISCHARGE NOTE PROVIDER - CARE PROVIDERS DIRECT ADDRESSES
[email or web address]

## 2025-04-16 NOTE — DISCHARGE NOTE PROVIDER - CARE PROVIDER_API CALL
hAmet Puentes  Surgery  [address], [address]  [place] [address]  Phone: [phone]  Fax: [phone]  Follow Up Time: 2 weeks    Tommy Lopez  Foot and Ankle Surgery  [address], [place] [address]  Phone: [phone]  Fax: [phone]  Follow Up Time: 2 weeks

## 2025-04-17 ENCOUNTER — TRANSCRIPTION ENCOUNTER (OUTPATIENT)
Age: 89
End: 2025-04-17

## 2025-04-17 VITALS
OXYGEN SATURATION: 100 % | RESPIRATION RATE: 16 BRPM | TEMPERATURE: 98 F | SYSTOLIC BLOOD PRESSURE: 140 MMHG | DIASTOLIC BLOOD PRESSURE: 79 MMHG | HEART RATE: 81 BPM

## 2025-04-17 LAB
ANION GAP SERPL CALC-SCNC: 12 MMOL/L — SIGNIFICANT CHANGE UP (ref 5–17)
BUN SERPL-MCNC: 27 MG/DL — HIGH (ref 7–23)
CALCIUM SERPL-MCNC: 8.5 MG/DL — SIGNIFICANT CHANGE UP (ref 8.4–10.5)
CHLORIDE SERPL-SCNC: 94 MMOL/L — LOW (ref 96–108)
CO2 SERPL-SCNC: 27 MMOL/L — SIGNIFICANT CHANGE UP (ref 22–31)
CREAT SERPL-MCNC: 0.98 MG/DL — SIGNIFICANT CHANGE UP (ref 0.5–1.3)
EGFR: 53 ML/MIN/1.73M2 — LOW
EGFR: 53 ML/MIN/1.73M2 — LOW
GLUCOSE SERPL-MCNC: 89 MG/DL — SIGNIFICANT CHANGE UP (ref 70–99)
HCT VFR BLD CALC: 27.5 % — LOW (ref 34.5–45)
HGB BLD-MCNC: 8.8 G/DL — LOW (ref 11.5–15.5)
MAGNESIUM SERPL-MCNC: 2 MG/DL — SIGNIFICANT CHANGE UP (ref 1.6–2.6)
MCHC RBC-ENTMCNC: 28.7 PG — SIGNIFICANT CHANGE UP (ref 27–34)
MCHC RBC-ENTMCNC: 32 G/DL — SIGNIFICANT CHANGE UP (ref 32–36)
MCV RBC AUTO: 89.6 FL — SIGNIFICANT CHANGE UP (ref 80–100)
NRBC BLD AUTO-RTO: 0 /100 WBCS — SIGNIFICANT CHANGE UP (ref 0–0)
PHOSPHATE SERPL-MCNC: 4 MG/DL — SIGNIFICANT CHANGE UP (ref 2.5–4.5)
PLATELET # BLD AUTO: 359 K/UL — SIGNIFICANT CHANGE UP (ref 150–400)
POTASSIUM SERPL-MCNC: 4.1 MMOL/L — SIGNIFICANT CHANGE UP (ref 3.5–5.3)
POTASSIUM SERPL-SCNC: 4.1 MMOL/L — SIGNIFICANT CHANGE UP (ref 3.5–5.3)
RBC # BLD: 3.07 M/UL — LOW (ref 3.8–5.2)
RBC # FLD: 15.1 % — HIGH (ref 10.3–14.5)
SODIUM SERPL-SCNC: 133 MMOL/L — LOW (ref 135–145)
WBC # BLD: 6.55 K/UL — SIGNIFICANT CHANGE UP (ref 3.8–10.5)
WBC # FLD AUTO: 6.55 K/UL — SIGNIFICANT CHANGE UP (ref 3.8–10.5)

## 2025-04-17 PROCEDURE — 82803 BLOOD GASES ANY COMBINATION: CPT

## 2025-04-17 PROCEDURE — 83735 ASSAY OF MAGNESIUM: CPT

## 2025-04-17 PROCEDURE — 82947 ASSAY GLUCOSE BLOOD QUANT: CPT

## 2025-04-17 PROCEDURE — 93005 ELECTROCARDIOGRAM TRACING: CPT

## 2025-04-17 PROCEDURE — 83935 ASSAY OF URINE OSMOLALITY: CPT

## 2025-04-17 PROCEDURE — 97166 OT EVAL MOD COMPLEX 45 MIN: CPT

## 2025-04-17 PROCEDURE — 86901 BLOOD TYPING SEROLOGIC RH(D): CPT

## 2025-04-17 PROCEDURE — 71045 X-RAY EXAM CHEST 1 VIEW: CPT

## 2025-04-17 PROCEDURE — 86850 RBC ANTIBODY SCREEN: CPT

## 2025-04-17 PROCEDURE — 83605 ASSAY OF LACTIC ACID: CPT

## 2025-04-17 PROCEDURE — 84100 ASSAY OF PHOSPHORUS: CPT

## 2025-04-17 PROCEDURE — 71275 CT ANGIOGRAPHY CHEST: CPT | Mod: MC

## 2025-04-17 PROCEDURE — 83930 ASSAY OF BLOOD OSMOLALITY: CPT

## 2025-04-17 PROCEDURE — 85610 PROTHROMBIN TIME: CPT

## 2025-04-17 PROCEDURE — 84295 ASSAY OF SERUM SODIUM: CPT

## 2025-04-17 PROCEDURE — 71045 X-RAY EXAM CHEST 1 VIEW: CPT | Mod: 26

## 2025-04-17 PROCEDURE — 85730 THROMBOPLASTIN TIME PARTIAL: CPT

## 2025-04-17 PROCEDURE — 80048 BASIC METABOLIC PNL TOTAL CA: CPT

## 2025-04-17 PROCEDURE — 84484 ASSAY OF TROPONIN QUANT: CPT

## 2025-04-17 PROCEDURE — 82435 ASSAY OF BLOOD CHLORIDE: CPT

## 2025-04-17 PROCEDURE — 97530 THERAPEUTIC ACTIVITIES: CPT

## 2025-04-17 PROCEDURE — 85025 COMPLETE CBC W/AUTO DIFF WBC: CPT

## 2025-04-17 PROCEDURE — 86900 BLOOD TYPING SEROLOGIC ABO: CPT

## 2025-04-17 PROCEDURE — 85027 COMPLETE CBC AUTOMATED: CPT

## 2025-04-17 PROCEDURE — 99285 EMERGENCY DEPT VISIT HI MDM: CPT

## 2025-04-17 PROCEDURE — 99233 SBSQ HOSP IP/OBS HIGH 50: CPT

## 2025-04-17 PROCEDURE — 74177 CT ABD & PELVIS W/CONTRAST: CPT | Mod: MC

## 2025-04-17 PROCEDURE — 97116 GAIT TRAINING THERAPY: CPT

## 2025-04-17 PROCEDURE — 84300 ASSAY OF URINE SODIUM: CPT

## 2025-04-17 PROCEDURE — 87637 SARSCOV2&INF A&B&RSV AMP PRB: CPT

## 2025-04-17 PROCEDURE — 80053 COMPREHEN METABOLIC PANEL: CPT

## 2025-04-17 PROCEDURE — 82330 ASSAY OF CALCIUM: CPT

## 2025-04-17 PROCEDURE — 32555 ASPIRATE PLEURA W/ IMAGING: CPT

## 2025-04-17 PROCEDURE — 83880 ASSAY OF NATRIURETIC PEPTIDE: CPT

## 2025-04-17 PROCEDURE — 71046 X-RAY EXAM CHEST 2 VIEWS: CPT

## 2025-04-17 PROCEDURE — 97162 PT EVAL MOD COMPLEX 30 MIN: CPT

## 2025-04-17 PROCEDURE — 84132 ASSAY OF SERUM POTASSIUM: CPT

## 2025-04-17 PROCEDURE — 85018 HEMOGLOBIN: CPT

## 2025-04-17 PROCEDURE — 85014 HEMATOCRIT: CPT

## 2025-04-17 RX ORDER — METOPROLOL SUCCINATE 50 MG/1
0 TABLET, EXTENDED RELEASE ORAL
Qty: 0 | Refills: 0 | DISCHARGE

## 2025-04-17 RX ORDER — FLUTICASONE PROPIONATE 50 UG/1
0 SPRAY, METERED NASAL
Qty: 0 | Refills: 0 | DISCHARGE

## 2025-04-17 RX ORDER — LEVOTHYROXINE SODIUM 300 MCG
1 TABLET ORAL
Qty: 0 | Refills: 0 | DISCHARGE
Start: 2025-04-17

## 2025-04-17 RX ORDER — ATORVASTATIN CALCIUM 80 MG/1
1 TABLET, FILM COATED ORAL
Qty: 0 | Refills: 0 | DISCHARGE
Start: 2025-04-17

## 2025-04-17 RX ORDER — METOPROLOL SUCCINATE 50 MG/1
1 TABLET, EXTENDED RELEASE ORAL
Qty: 0 | Refills: 0 | DISCHARGE
Start: 2025-04-17

## 2025-04-17 RX ORDER — LETROZOLE 2.5 MG/1
0 TABLET, FILM COATED ORAL
Qty: 0 | Refills: 1 | DISCHARGE

## 2025-04-17 RX ORDER — HYDROCHLOROTHIAZIDE 50 MG/1
0 TABLET ORAL
Qty: 0 | Refills: 0 | DISCHARGE

## 2025-04-17 RX ORDER — LETROZOLE 2.5 MG/1
1 TABLET, FILM COATED ORAL
Qty: 0 | Refills: 0 | DISCHARGE
Start: 2025-04-17

## 2025-04-17 RX ORDER — ACETAMINOPHEN 500 MG/5ML
2 LIQUID (ML) ORAL
Qty: 0 | Refills: 0 | DISCHARGE
Start: 2025-04-17

## 2025-04-17 RX ORDER — LIDOCAINE HYDROCHLORIDE 20 MG/ML
0 JELLY TOPICAL
Qty: 0 | Refills: 0 | DISCHARGE
Start: 2025-04-17

## 2025-04-17 RX ORDER — LEVOTHYROXINE SODIUM 300 MCG
0 TABLET ORAL
Qty: 0 | Refills: 0 | DISCHARGE

## 2025-04-17 RX ORDER — B1/B2/B3/B5/B6/B12/VIT C/FOLIC 500-0.5 MG
1 TABLET ORAL
Qty: 0 | Refills: 0 | DISCHARGE
Start: 2025-04-17

## 2025-04-17 RX ORDER — SENNA 187 MG
2 TABLET ORAL
Qty: 0 | Refills: 0 | DISCHARGE
Start: 2025-04-17

## 2025-04-17 RX ORDER — FLUTICASONE PROPIONATE 50 UG/1
1 SPRAY, METERED NASAL
Qty: 0 | Refills: 0 | DISCHARGE
Start: 2025-04-17

## 2025-04-17 RX ORDER — POLYETHYLENE GLYCOL 3350 17 G/17G
17 POWDER, FOR SOLUTION ORAL
Qty: 0 | Refills: 0 | DISCHARGE
Start: 2025-04-17

## 2025-04-17 RX ADMIN — Medication 100 MICROGRAM(S): at 05:02

## 2025-04-17 RX ADMIN — METOPROLOL SUCCINATE 25 MILLIGRAM(S): 50 TABLET, EXTENDED RELEASE ORAL at 05:55

## 2025-04-17 RX ADMIN — Medication 40 MILLIGRAM(S): at 05:53

## 2025-04-17 RX ADMIN — LETROZOLE 2.5 MILLIGRAM(S): 2.5 TABLET, FILM COATED ORAL at 12:02

## 2025-04-17 RX ADMIN — Medication 1 TABLET(S): at 12:02

## 2025-04-17 RX ADMIN — METOPROLOL SUCCINATE 25 MILLIGRAM(S): 50 TABLET, EXTENDED RELEASE ORAL at 17:53

## 2025-04-17 RX ADMIN — Medication 1 APPLICATION(S): at 06:35

## 2025-04-17 RX ADMIN — ENOXAPARIN SODIUM 30 MILLIGRAM(S): 100 INJECTION SUBCUTANEOUS at 09:09

## 2025-04-17 NOTE — DISCHARGE NOTE NURSING/CASE MANAGEMENT/SOCIAL WORK - NSTRANSFERBELONGINGSRESP_GEN_A_NUR
Subjective:     CC: sinus congestion    HPI:   Cristin presents today with     No fever  Sinus pain and congestion  Tolerates azithyromycin      bp low  Not light headed     Problem   Papillary Carcinoma, Follicular Variant (Hcc)   Asthma       Current Outpatient Medications Ordered in Epic   Medication Sig Dispense Refill   • azithromycin (ZITHROMAX) 250 MG Tab Two tabs po for 1 day, then 1 tab po for 4 days 6 Tablet 0   • fluticasone (FLONASE) 50 MCG/ACT nasal spray Administer 1 Spray into affected nostril(S) every day. 16 g 1   • donepezil (ARICEPT) 10 MG tablet Take 1 Tablet by mouth every day. 90 Tablet 3   • levothyroxine (SYNTHROID) 50 MCG Tab Take one tablet each morning, on an empty stomach, 30 minutes before breakfast. 90 Tablet 3   • traZODone (DESYREL) 50 MG Tab Take 1 Tablet by mouth at bedtime. 90 Tablet 3   • Albuterol Sulfate 108 (90 Base) MCG/ACT AEROSOL POWDER, BREATH ACTIVATED Inhale 2 Puffs every 6 hours as needed.     • vitamin D (CHOLECALCIFEROL) 1000 UNIT Tab Take 1,000 Units by mouth every day.     • MULTIVITAMINS PO Take 1 Tablet by mouth every day.       No current [facility]-ordered facility-administered medications on file.     ROS:  Review of Systems   Constitutional: Negative for chills and fever.   HENT: Positive for congestion and sinus pain. Negative for ear pain and sore throat.    Eyes: Negative for blurred vision and double vision.   Respiratory: Negative for cough and shortness of breath.    Cardiovascular: Negative for chest pain and palpitations.   Gastrointestinal: Negative for nausea and vomiting.   Genitourinary: Negative for dysuria and hematuria.   Musculoskeletal: Negative for myalgias.   Neurological: Negative for dizziness and headaches.       Objective:     Exam:  BP (!) 90/56 (BP Location: Left arm, Patient Position: Sitting, BP Cuff Size: Adult)   Pulse 68   Temp 36.3 °C (97.3 °F) (Temporal)   Ht 1.524 m (5')   Wt 49.9 kg (110 lb)   SpO2 98%   BMI 21.48 kg/m²  Body  mass index is 21.48 kg/m².    Physical Exam  Constitutional:       Appearance: Normal appearance.   HENT:      Nose: Congestion present. No rhinorrhea.   Eyes:      General: No scleral icterus.        Right eye: No discharge.         Left eye: No discharge.      Extraocular Movements: Extraocular movements intact.      Conjunctiva/sclera: Conjunctivae normal.      Pupils: Pupils are equal, round, and reactive to light.   Abdominal:      General: There is no distension.   Neurological:      Mental Status: She is alert.      Coordination: Coordination normal.      Gait: Gait normal.             Assessment & Plan:     80 y.o. female with the following -     Problem List Items Addressed This Visit    None     Visit Diagnoses     Sinus congestion        Relevant Medications    azithromycin (ZITHROMAX) 250 MG Tab    fluticasone (FLONASE) 50 MCG/ACT nasal spray        No follow-ups on file.    Please note that this dictation was created using voice recognition software. I have made every reasonable attempt to correct obvious errors, but I expect that there are errors of grammar and possibly content that I did not discover before finalizing the note.       yes

## 2025-04-17 NOTE — PROGRESS NOTE ADULT - SUBJECTIVE AND OBJECTIVE BOX
Trauma Surgery Progress Note    SUBJECTIVE  The patient was seen and examined. No acute events overnight. Pain controlled, afebrile w/ stable vitals on 2LNC. Hyponatremia improved to 133    OBJECTIVE  ___________________________________________________  VITAL SIGNS / I&O's   Vital Signs Last 24 Hrs  T(C): 36.3 (17 Apr 2025 12:56), Max: 37 (16 Apr 2025 19:35)  T(F): 97.3 (17 Apr 2025 12:56), Max: 98.6 (16 Apr 2025 19:35)  HR: 78 (17 Apr 2025 12:56) (49 - 100)  BP: 128/65 (17 Apr 2025 12:56) (100/57 - 146/70)  BP(mean): 100 (16 Apr 2025 19:00) (73 - 100)  RR: 18 (17 Apr 2025 12:56) (18 - 47)  SpO2: 100% (17 Apr 2025 12:56) (91% - 100%)    Parameters below as of 17 Apr 2025 12:56  Patient On (Oxygen Delivery Method): nasal cannula  O2 Flow (L/min): 2        16 Apr 2025 07:01  -  17 Apr 2025 07:00  --------------------------------------------------------  IN:    Oral Fluid: 800 mL  Total IN: 800 mL    OUT:    Voided (mL): 1100 mL  Total OUT: 1100 mL    Total NET: -300 mL        ___________________________________________________  PHYSICAL EXAM    -- CONSTITUTIONAL: NAD, lying in bed  -- NEURO: Awake, alert  -- HEENT: NC/AT, mucous membranes moist  -- NECK: Soft, no asymmetry  -- CHEST: breathing comfortably on 2LNC, ttp L chest.   -- ABDOMEN: Nondistended  -- EXTREMITIES: Warm and well perfused      ___________________________________________________  LABS                        8.8    6.55  )-----------( 359      ( 17 Apr 2025 05:16 )             27.5     17 Apr 2025 05:16    133    |  94     |  27     ----------------------------<  89     4.1     |  27     |  0.98     Ca    8.5        17 Apr 2025 05:16  Phos  4.0       17 Apr 2025 05:16  Mg     2.0       17 Apr 2025 05:16      PT/INR - ( 16 Apr 2025 05:43 )   PT: 14.0 sec;   INR: 1.22 ratio         PTT - ( 16 Apr 2025 05:43 )  PTT:27.8 sec  CAPILLARY BLOOD GLUCOSE            Urinalysis Basic - ( 17 Apr 2025 05:16 )    Color: x / Appearance: x / SG: x / pH: x  Gluc: 89 mg/dL / Ketone: x  / Bili: x / Urobili: x   Blood: x / Protein: x / Nitrite: x   Leuk Esterase: x / RBC: x / WBC x   Sq Epi: x / Non Sq Epi: x / Bacteria: x      ___________________________________________________  MICRO  Recent Cultures:    ___________________________________________________  MEDICATIONS  (STANDING):  atorvastatin 20 milliGRAM(s) Oral at bedtime  chlorhexidine 2% Cloths 1 Application(s) Topical <User Schedule>  enoxaparin Injectable 30 milliGRAM(s) SubCutaneous every 24 hours  influenza  Vaccine (HIGH DOSE) 0.5 milliLiter(s) IntraMuscular once  letrozole 2.5 milliGRAM(s) Oral daily  levothyroxine 100 MICROGram(s) Oral <User Schedule>  levothyroxine 50 MICROGram(s) Oral <User Schedule>  lidocaine   4% Patch 1 Patch Transdermal every 24 hours  metoprolol tartrate 25 milliGRAM(s) Oral every 12 hours  multivitamin 1 Tablet(s) Oral daily  pantoprazole    Tablet 40 milliGRAM(s) Oral before breakfast  polyethylene glycol 3350 17 Gram(s) Oral every 24 hours  senna 2 Tablet(s) Oral at bedtime    MEDICATIONS  (PRN):  acetaminophen     Tablet .. 1000 milliGRAM(s) Oral every 6 hours PRN Mild Pain (1 - 3), Moderate Pain (4 - 6)  fluticasone propionate 50 MICROgram(s)/spray Nasal Spray 1 Spray(s) Both Nostrils two times a day PRN Congestion

## 2025-04-17 NOTE — CHART NOTE - NSCHARTNOTEFT_GEN_A_CORE
Wound Care Team Note:    Request for wound care consult for foot/toe wound received and referred to podiatry. Will defer to podiatry for management. Please contact Podiatry for questions/concerns. Will not follow.    Lore Lebron NP-C, CWOCN via TEAMS

## 2025-04-17 NOTE — PROGRESS NOTE ADULT - ATTENDING COMMENTS
seen and examined 04-17-25 @ 1026    SpO2 = 97% on 2 lpm nasal cannula  mild left posterior chest wall tenderness    WBC = 6  hgb - 8.8 g/dL  Na - 133  HCO3 - 27    CXR (4/17) - small residual left hemothorax (I reviewed the radiologic image)      left 7-12 subacute posterolateral minimally displaced rib fractures  -multimodal pain control    delayed traumatic hemothorax s/p thoracentesis catheter drainage (4/14 - 4/15)  -minimal residual left hemothorax on 4/17 CXR, but no indication for VATS given advanced age    hyponatremia  -improving after HCTZ was held    dispo  -transfer to [facility] when available

## 2025-04-17 NOTE — DISCHARGE NOTE NURSING/CASE MANAGEMENT/SOCIAL WORK - NSDCFUADDAPPT_GEN_ALL_CORE_FT
Podiatry Discharge Instructions:  Follow up: Please follow up with Dr. Lopez within 1 week of discharge from the hospital, please call [phone] for appointment and discuss that you recently were seen in the hospital.  Please offload foot in z flow boots when in bed or chair

## 2025-04-17 NOTE — PROGRESS NOTE ADULT - NUTRITIONAL ASSESSMENT
This patient has been assessed with a concern for Malnutrition and has been determined to have a diagnosis/diagnoses of Severe protein-calorie malnutrition.    This patient is being managed with:   Diet Regular-  Entered: Apr 14 2025  8:16PM  

## 2025-04-17 NOTE — DISCHARGE NOTE NURSING/CASE MANAGEMENT/SOCIAL WORK - FINANCIAL ASSISTANCE
[facility] provides services at a reduced cost to those who are determined to be eligible through [facility]’s financial assistance program. Information regarding [facility]’s financial assistance program can be found by going to [email or web address] or by calling [phone].

## 2025-04-17 NOTE — DISCHARGE NOTE NURSING/CASE MANAGEMENT/SOCIAL WORK - PATIENT PORTAL LINK FT
You can access the FollowMyHealth Patient Portal offered by [facility] by registering at the following website: [email or web address]. By joining [employer]’s FollowMyHealth portal, you will also be able to view your health information using other applications (apps) compatible with our system.

## 2025-04-17 NOTE — PROGRESS NOTE ADULT - ASSESSMENT
89F PMHx HTN, HLD, temporal arteritis, breast cancer in remission who had a mechanical fall on 03/28/25, now presenting with SOB found to have fractures of the left 7th-12th rubs and findings suggestive of a large left hemothorax with mild shift of the mediastinum.     Plan:   - Monitor pigtail OP  - Patient would need thoracic surgery consult if hematoma remains s/p pigtail   - Encourage IS  - Pain control   - PT  - Rest of care per SICU    ACS  [id number]
ASSESSMENT: 89F PMHx HTN, HLD, temporal arteritis, breast cancer in remission who had a mechanical fall on 03/28/25, now presenting with SOB found to have fractures of the left 7th-12th rubs and findings suggestive of a large left hemothorax with mild shift of the mediastinum s/p pigtail placement in ED w/ 2L of mixed old blood, now removed 4/15. SICU for HDM.     PLAN:    NEURO:  - A&O x 4  - Pain: tylenol, lido patch    RESPIRATORY:   - S/p pigtail for NOAH, now removed 4/15  - Supplemental O2 PRN (3L) for O2 sat > 92%  - Encourage IS    CARDIOVASCULAR:  - Hemodynamically stable  - C/w home metoprolol, statin  - Trend lactate    GI/NUTRITION:  - Diet: regular  - Continue home pantoprazole    GENITOURINARY/RENAL:  - monitor I&Os, trend electrolytes & replete PRN  - hyponatremic, salt tabs    HEMATOLOGIC:  - trend CBC/coags  - Lovenox/ SCDs for VTE ppx    INFECTIOUS DISEASE:  - Continue home letrozole (immunotherapy)  - RVP negative (4/14)  - No active evidence of infection, monitor off abx    ENDOCRINE:  - Trend glucose  - Home synthroid    Code Status: full code    Disposition: SICU    Marilynn Basilio PA-C     Please call [phone] after 6am
89 f with    Ribs fractures  - Incentive spirometry  - pain control    Hemothorax  - s/p CT    Respiratory failure  - supplement O2    Hyponatremia  - fluid restrict  - follow    HTN  - control    Hyperlipedemia  - stable    Hypothyroid  - follow TSH  - supplement    Breast cancer  - Letrozole    Anemia  - iron studies, B12    DVT prophylaxis    d/w patient, sister    Marty Hollis MD phone [phone] 
89F PMHx HTN, HLD, temporal arteritis, breast cancer in remission who had a mechanical fall on 03/28/25, now presenting with SOB found to have fractures of the left 7th-12th rubs and findings suggestive of a large left hemothorax with mild shift of the mediastinum.     Plan:   - Monitor pigtail OP  - Patient would need thoracic surgery consult if hematoma remains s/p pigtail   - Encourage IS  - Pain control   - PT  - Rest of care per SICU    ACS  [id number]
ASSESSMENT: 89-year-old female with past medical history of HTN, HLD, temporal arteritis, breast cancer in remission, suffered mechanical fall on 3/28/2025.  After fall presented to PCP who diagnosed her with rib fractures, recommended pain management and rest.  Patient now had acute worsening of shortness of breath and increased work of breathing, hypoxic to mid 80s, presented to ED with CT chest showing evidence of left 7 through 12 rib fractures and large left hemothorax with mid shifted mediastinum.  Patient had pigtail chest tube placed with 2 L of mixed old blood removed.  Patient with marked improvement of respiratory effort after pigtail placement.  Patient is admitted to SICU for hemodynamic monitoring, respiratory observation, and management of hemothorax.    Injuries:  Left 7-12 rib fractures  Left hemothorax with midline mediastinal shift    PLAN:    NEURO:  - Alert and oriented x4, mentating well  - Pain: tylenol    RESPIRATORY:   - CT w/ 7-12 rib fx and large left hemothorax with midline mediastinal shift  - S/p pigtail with 2L of sanguinous (mixed new and old) output, w/ immediate improvement in resp status  - Continue to monitor CT output  - Supplemental O2 PRN for O2 sat > 92%  - Encourage IS  - Daily XR    CARDIOVASCULAR:  - Hemodynamically stable  - C/w home metoprolol, statin  - Trend lactate      GI/NUTRITION:  - Diet: regular  - Continue home pantoprazole    GENITOURINARY/RENAL:  - Creatinine normal on admission  - Monitor urine output  - Replete lytes as indicated K > 4, Phos > 3, Mg > 2      HEMATOLOGIC:  - SCD for DVT prophylaxis  - Hold chemical prophylaxis in setting of hemothorax  - Trend q6 CBC for H/H stability      INFECTIOUS DISEASE:  - Continue home letrozole (immunotherapy)  - RVP negative (4/14)  - No active evidence of infection, monitor off abx  - Trend WBC and fever curve      ENDOCRINE:  - Trend sugars  - Home synthroid    Dispo: SICU    OMAR KeaneC  Surgical Intensive Care Unit  [id number]
89F PMHx HTN, HLD, temporal arteritis, breast cancer in remission who had a mechanical fall on 03/28/25, now presenting with SOB found to have fractures of the left 7th-12th rubs and findings suggestive of a large left hemothorax with mild shift of the mediastinum.     Plan:   - Pigtail dc'd  - Encourage IS  - Pain control   - PT  - Pending [facility]    ACS  [id number]  
89F PMHx HTN, HLD, temporal arteritis, breast cancer in remission who had a mechanical fall on 03/28/25. Imaging showed fractures of the left seventh through the left 12th ribs and findings suggestive of a large left hemothorax. Mild shift of the mediastinum towards the right is noted. s/p pigtail - now removed

## 2025-04-17 NOTE — PROGRESS NOTE ADULT - SUBJECTIVE AND OBJECTIVE BOX
Patient is a 89y old  Female who presents with a chief complaint of Fall (17 Apr 2025 14:07)      SUBJECTIVE / OVERNIGHT EVENTS: Comfortable without new complaints. Sister at bedside.  Review of Systems  chest pain no  palpitations no  sob no  nausea no  headache no    MEDICATIONS  (STANDING):  atorvastatin 20 milliGRAM(s) Oral at bedtime  chlorhexidine 2% Cloths 1 Application(s) Topical <User Schedule>  enoxaparin Injectable 30 milliGRAM(s) SubCutaneous every 24 hours  influenza  Vaccine (HIGH DOSE) 0.5 milliLiter(s) IntraMuscular once  letrozole 2.5 milliGRAM(s) Oral daily  levothyroxine 100 MICROGram(s) Oral <User Schedule>  levothyroxine 50 MICROGram(s) Oral <User Schedule>  lidocaine   4% Patch 1 Patch Transdermal every 24 hours  metoprolol tartrate 25 milliGRAM(s) Oral every 12 hours  multivitamin 1 Tablet(s) Oral daily  pantoprazole    Tablet 40 milliGRAM(s) Oral before breakfast  polyethylene glycol 3350 17 Gram(s) Oral every 24 hours  senna 2 Tablet(s) Oral at bedtime    MEDICATIONS  (PRN):  acetaminophen     Tablet .. 1000 milliGRAM(s) Oral every 6 hours PRN Mild Pain (1 - 3), Moderate Pain (4 - 6)  fluticasone propionate 50 MICROgram(s)/spray Nasal Spray 1 Spray(s) Both Nostrils two times a day PRN Congestion      Vital Signs Last 24 Hrs  T(C): 36.8 (17 Apr 2025 16:18), Max: 37 (16 Apr 2025 19:35)  T(F): 98.2 (17 Apr 2025 16:18), Max: 98.6 (16 Apr 2025 19:35)  HR: 81 (17 Apr 2025 16:18) (49 - 88)  BP: 140/79 (17 Apr 2025 16:18) (122/86 - 146/70)  BP(mean): 100 (16 Apr 2025 19:00) (91 - 100)  RR: 16 (17 Apr 2025 16:18) (16 - 44)  SpO2: 100% (17 Apr 2025 16:18) (91% - 100%)    Parameters below as of 17 Apr 2025 16:18  Patient On (Oxygen Delivery Method): room air        PHYSICAL EXAM:  GENERAL: NAD, well-developed  HEAD:  Atraumatic, Normocephalic  EYES: EOMI, PERRLA, conjunctiva and sclera clear  NECK: Supple, No JVD  CHEST/LUNG: Clear to auscultation bilaterally; No wheeze  HEART: Regular rate and rhythm; No murmurs, rubs, or gallops  ABDOMEN: Soft, Nontender, Nondistended; Bowel sounds present  EXTREMITIES:  2+ Peripheral Pulses, No clubbing, cyanosis, or edema  PSYCH: AAOx3  NEUROLOGY: non-focal  SKIN: No rashes or lesions    LABS:                        8.8    6.55  )-----------( 359      ( 17 Apr 2025 05:16 )             27.5     04-17    133[L]  |  94[L]  |  27[H]  ----------------------------<  89  4.1   |  27  |  0.98    Ca    8.5      17 Apr 2025 05:16  Phos  4.0     04-17  Mg     2.0     04-17      PT/INR - ( 16 Apr 2025 05:43 )   PT: 14.0 sec;   INR: 1.22 ratio         PTT - ( 16 Apr 2025 05:43 )  PTT:27.8 sec      Urinalysis Basic - ( 17 Apr 2025 05:16 )    Color: x / Appearance: x / SG: x / pH: x  Gluc: 89 mg/dL / Ketone: x  / Bili: x / Urobili: x   Blood: x / Protein: x / Nitrite: x   Leuk Esterase: x / RBC: x / WBC x   Sq Epi: x / Non Sq Epi: x / Bacteria: x          RADIOLOGY & ADDITIONAL TESTS:    Imaging Personally Reviewed:    Consultant(s) Notes Reviewed:      Care Discussed with Consultants/Other Providers:

## 2025-05-14 ENCOUNTER — APPOINTMENT (OUTPATIENT)
Age: 89
End: 2025-05-14

## 2025-06-11 ENCOUNTER — APPOINTMENT (OUTPATIENT)
Dept: PODIATRY | Facility: CLINIC | Age: 89
End: 2025-06-11
Payer: MEDICARE

## 2025-06-11 PROCEDURE — 11056 PARNG/CUTG B9 HYPRKR LES 2-4: CPT | Mod: Q8

## 2025-06-11 PROCEDURE — 11720 DEBRIDE NAIL 1-5: CPT | Mod: Q8,59

## 2025-07-31 ENCOUNTER — RESULT REVIEW (OUTPATIENT)
Age: 89
End: 2025-07-31

## 2025-07-31 ENCOUNTER — APPOINTMENT (OUTPATIENT)
Dept: ULTRASOUND IMAGING | Facility: IMAGING CENTER | Age: 89
End: 2025-07-31
Payer: MEDICARE

## 2025-07-31 ENCOUNTER — APPOINTMENT (OUTPATIENT)
Dept: MAMMOGRAPHY | Facility: IMAGING CENTER | Age: 89
End: 2025-07-31
Payer: MEDICARE

## 2025-07-31 ENCOUNTER — OUTPATIENT (OUTPATIENT)
Dept: OUTPATIENT SERVICES | Facility: HOSPITAL | Age: 89
LOS: 1 days | End: 2025-07-31
Payer: MEDICARE

## 2025-07-31 DIAGNOSIS — Z00.8 ENCOUNTER FOR OTHER GENERAL EXAMINATION: ICD-10-CM

## 2025-07-31 PROCEDURE — 76641 ULTRASOUND BREAST COMPLETE: CPT

## 2025-07-31 PROCEDURE — 76641 ULTRASOUND BREAST COMPLETE: CPT | Mod: 26,50,GA

## 2025-07-31 PROCEDURE — G0279: CPT | Mod: 26

## 2025-07-31 PROCEDURE — 77066 DX MAMMO INCL CAD BI: CPT

## 2025-07-31 PROCEDURE — G0279: CPT

## 2025-07-31 PROCEDURE — 77066 DX MAMMO INCL CAD BI: CPT | Mod: 26

## 2025-09-03 ENCOUNTER — APPOINTMENT (OUTPATIENT)
Dept: HEMATOLOGY ONCOLOGY | Facility: CLINIC | Age: 89
End: 2025-09-03
Payer: MEDICARE

## 2025-09-03 VITALS
HEIGHT: 58.27 IN | TEMPERATURE: 97.7 F | DIASTOLIC BLOOD PRESSURE: 91 MMHG | WEIGHT: 111.55 LBS | OXYGEN SATURATION: 92 % | SYSTOLIC BLOOD PRESSURE: 170 MMHG | RESPIRATION RATE: 17 BRPM | BODY MASS INDEX: 23.1 KG/M2 | HEART RATE: 64 BPM

## 2025-09-03 DIAGNOSIS — C84.00 MYCOSIS FUNGOIDES, UNSPECIFIED SITE: ICD-10-CM

## 2025-09-03 DIAGNOSIS — C50.919 MALIGNANT NEOPLASM OF UNSPECIFIED SITE OF UNSPECIFIED FEMALE BREAST: ICD-10-CM

## 2025-09-03 PROCEDURE — 99213 OFFICE O/P EST LOW 20 MIN: CPT

## 2025-09-16 ENCOUNTER — TRANSCRIPTION ENCOUNTER (OUTPATIENT)
Age: 89
End: 2025-09-16

## 2025-09-17 ENCOUNTER — TRANSCRIPTION ENCOUNTER (OUTPATIENT)
Age: 89
End: 2025-09-17

## 2025-09-17 ENCOUNTER — APPOINTMENT (OUTPATIENT)
Dept: PODIATRY | Facility: CLINIC | Age: 89
End: 2025-09-17

## 2025-09-17 ENCOUNTER — RESULT REVIEW (OUTPATIENT)
Age: 89
End: 2025-09-17